# Patient Record
Sex: FEMALE | Race: WHITE | NOT HISPANIC OR LATINO | Employment: OTHER | URBAN - METROPOLITAN AREA
[De-identification: names, ages, dates, MRNs, and addresses within clinical notes are randomized per-mention and may not be internally consistent; named-entity substitution may affect disease eponyms.]

---

## 2017-03-28 ASSESSMENT — ACTIVITIES OF DAILY LIVING (ADL)
DO_MEMBERS_OF_YOUR_HOUSEHOLD_USE_SAFETY_HELMETS?: Y
DO_MEMBERS_OF_YOUR_HOUSEHOLD_WEAR_SEAT_BELTS?: Y
ARE_THERE_SMOKE_DETECTORS_IN_YOUR_HOME?: Y
ARE_THERE_FIREARMS_IN_YOUR_HOME?: N
DO_MEMBERS_OF_YOUR_HOUSEHOLD_USE_SUNSCREEN?: Y
ARE_THERE_CARBON_MONOXIDE_DETECTORS_IN_YOUR_HOME?: Y

## 2017-04-11 ENCOUNTER — OFFICE VISIT (OUTPATIENT)
Dept: INTERNAL MEDICINE | Facility: CLINIC | Age: 67
End: 2017-04-11

## 2017-04-11 VITALS
WEIGHT: 194.6 LBS | HEART RATE: 77 BPM | SYSTOLIC BLOOD PRESSURE: 126 MMHG | HEIGHT: 68 IN | DIASTOLIC BLOOD PRESSURE: 78 MMHG | OXYGEN SATURATION: 97 % | TEMPERATURE: 98.4 F | BODY MASS INDEX: 29.49 KG/M2

## 2017-04-11 DIAGNOSIS — Z12.11 SCREEN FOR COLON CANCER: ICD-10-CM

## 2017-04-11 DIAGNOSIS — Z11.59 NEED FOR HEPATITIS C SCREENING TEST: ICD-10-CM

## 2017-04-11 DIAGNOSIS — Z23 NEED FOR PROPHYLACTIC VACCINATION AGAINST STREPTOCOCCUS PNEUMONIAE (PNEUMOCOCCUS): ICD-10-CM

## 2017-04-11 DIAGNOSIS — J45.990 EXERCISE-INDUCED ASTHMA: ICD-10-CM

## 2017-04-11 DIAGNOSIS — I10 ESSENTIAL HYPERTENSION: ICD-10-CM

## 2017-04-11 DIAGNOSIS — I25.10 CORONARY ARTERY DISEASE INVOLVING NATIVE HEART WITHOUT ANGINA PECTORIS, UNSPECIFIED VESSEL OR LESION TYPE: Primary | ICD-10-CM

## 2017-04-11 DIAGNOSIS — I25.10 CORONARY ARTERY DISEASE INVOLVING NATIVE HEART WITHOUT ANGINA PECTORIS, UNSPECIFIED VESSEL OR LESION TYPE: ICD-10-CM

## 2017-04-11 DIAGNOSIS — E28.39 ESTROGEN DEFICIENCY: ICD-10-CM

## 2017-04-11 LAB
ALBUMIN SERPL-MCNC: 4.1 G/DL (ref 3.4–5)
ALP SERPL-CCNC: 87 U/L (ref 40–150)
ALT SERPL W P-5'-P-CCNC: 42 U/L (ref 0–50)
ANION GAP SERPL CALCULATED.3IONS-SCNC: 6 MMOL/L (ref 3–14)
AST SERPL W P-5'-P-CCNC: 26 U/L (ref 0–45)
BASOPHILS # BLD AUTO: 0 10E9/L (ref 0–0.2)
BASOPHILS NFR BLD AUTO: 0.5 %
BILIRUB SERPL-MCNC: 0.7 MG/DL (ref 0.2–1.3)
BUN SERPL-MCNC: 19 MG/DL (ref 7–30)
CALCIUM SERPL-MCNC: 9 MG/DL (ref 8.5–10.1)
CHLORIDE SERPL-SCNC: 108 MMOL/L (ref 94–109)
CHOLEST SERPL-MCNC: 199 MG/DL
CO2 SERPL-SCNC: 26 MMOL/L (ref 20–32)
CREAT SERPL-MCNC: 0.89 MG/DL (ref 0.52–1.04)
DIFFERENTIAL METHOD BLD: NORMAL
EOSINOPHIL # BLD AUTO: 0.1 10E9/L (ref 0–0.7)
EOSINOPHIL NFR BLD AUTO: 1.6 %
ERYTHROCYTE [DISTWIDTH] IN BLOOD BY AUTOMATED COUNT: 12.6 % (ref 10–15)
GFR SERPL CREATININE-BSD FRML MDRD: 64 ML/MIN/1.7M2
GLUCOSE SERPL-MCNC: 110 MG/DL (ref 70–99)
HCT VFR BLD AUTO: 41.9 % (ref 35–47)
HCV AB SERPL QL IA: NORMAL
HDLC SERPL-MCNC: 86 MG/DL
HGB BLD-MCNC: 14.1 G/DL (ref 11.7–15.7)
IMM GRANULOCYTES # BLD: 0 10E9/L (ref 0–0.4)
IMM GRANULOCYTES NFR BLD: 0.4 %
LDLC SERPL CALC-MCNC: 104 MG/DL
LYMPHOCYTES # BLD AUTO: 1.1 10E9/L (ref 0.8–5.3)
LYMPHOCYTES NFR BLD AUTO: 20.8 %
MCH RBC QN AUTO: 29.9 PG (ref 26.5–33)
MCHC RBC AUTO-ENTMCNC: 33.7 G/DL (ref 31.5–36.5)
MCV RBC AUTO: 89 FL (ref 78–100)
MONOCYTES # BLD AUTO: 0.4 10E9/L (ref 0–1.3)
MONOCYTES NFR BLD AUTO: 8 %
NEUTROPHILS # BLD AUTO: 3.8 10E9/L (ref 1.6–8.3)
NEUTROPHILS NFR BLD AUTO: 68.7 %
NONHDLC SERPL-MCNC: 112 MG/DL
NRBC # BLD AUTO: 0 10*3/UL
NRBC BLD AUTO-RTO: 0 /100
PLATELET # BLD AUTO: 245 10E9/L (ref 150–450)
POTASSIUM SERPL-SCNC: 4.3 MMOL/L (ref 3.4–5.3)
PROT SERPL-MCNC: 7.4 G/DL (ref 6.8–8.8)
RBC # BLD AUTO: 4.71 10E12/L (ref 3.8–5.2)
SODIUM SERPL-SCNC: 140 MMOL/L (ref 133–144)
TRIGL SERPL-MCNC: 42 MG/DL
WBC # BLD AUTO: 5.5 10E9/L (ref 4–11)

## 2017-04-11 RX ORDER — TACROLIMUS 1 MG/G
OINTMENT TOPICAL
COMMUNITY
Start: 2017-03-09 | End: 2020-07-01

## 2017-04-11 RX ORDER — CLOBETASOL PROPIONATE 0.5 MG/ML
SOLUTION TOPICAL
COMMUNITY
Start: 2017-03-09

## 2017-04-11 RX ORDER — BUPROPION HYDROCHLORIDE 150 MG/1
TABLET ORAL
COMMUNITY
Start: 2017-03-11 | End: 2017-04-11

## 2017-04-11 RX ORDER — NITROGLYCERIN 0.4 MG/1
0.4 TABLET SUBLINGUAL EVERY 5 MIN PRN
Qty: 25 TABLET | Refills: 3 | Status: SHIPPED | OUTPATIENT
Start: 2017-04-11 | End: 2019-04-16

## 2017-04-11 RX ORDER — DESONIDE 0.5 MG/G
CREAM TOPICAL
COMMUNITY
Start: 2017-03-10

## 2017-04-11 RX ORDER — ATORVASTATIN CALCIUM 80 MG/1
80 TABLET, FILM COATED ORAL DAILY
Qty: 100 TABLET | Refills: 3 | Status: SHIPPED | OUTPATIENT
Start: 2017-04-11 | End: 2018-04-16

## 2017-04-11 ASSESSMENT — PAIN SCALES - GENERAL: PAINLEVEL: NO PAIN (1)

## 2017-04-11 NOTE — NURSING NOTE
Patient received Pneumonia vaccine.  See immunization list for administration details.  Patient tolerated injection well.     CHRISTA GARCIA at 10:05 AM on 4/11/2017.

## 2017-04-11 NOTE — NURSING NOTE
Chief Complaint   Patient presents with     Physical     Patient here for annual physical.     Tawnya Orellana LPN at 8:38 AM on 4/11/2017.

## 2017-04-11 NOTE — MR AVS SNAPSHOT
After Visit Summary   4/11/2017    Bridget Reeves    MRN: [id number]           Patient Information     Date Of Birth          1950        Visit Information        Provider Department      4/11/2017 9:00 AM Matthew Centeno MD [facility]        Today's Diagnoses     Coronary artery disease involving native heart without angina pectoris, unspecified vessel or lesion type    -  1    Screen for colon cancer        Need for hepatitis C screening test        Need for prophylactic vaccination against Streptococcus pneumoniae (pneumococcus)        Estrogen deficiency        Exercise-induced asthma        Essential hypertension          Care Instructions    Radiology  [phone]         Follow-ups after your visit        Future tests that were ordered for you today     Open Future Orders        Priority Expected Expires Ordered    Dexa hip/pelvis/spine* Routine  4/11/2018 4/11/2017    Hepatitis C Screen Reflex to HCV RNA Quant and Genotype Routine 4/11/2017 4/11/2018 4/11/2017    Fecal colorectal cancer screen FIT - Future (S+30) Routine 5/2/2017 5/11/2017 4/11/2017    Comprehensive metabolic panel Routine  4/11/2018 4/11/2017    Lipid Profile Routine  4/11/2018 4/11/2017    CBC with platelets differential Routine  4/11/2018 4/11/2017            Who to contact     Please call your clinic at [phone] to:    Ask questions about your health    Make or cancel appointments    Discuss your medicines    Learn about your test results    Speak to your doctor   If you have compliments or concerns about an experience at your clinic, or if you wish to file a complaint, please contact [facility] Patient Relations at [phone] or email us at [email or web address]         Additional Information About Your Visit        MyChart Information     [employer] gives you secure access to your electronic health record. If you see a primary care provider, you can  "also send messages to your care team and make appointments. If you have questions, please call your primary care clinic.  If you do not have a primary care provider, please call [phone] and they will assist you.      "[employer] is an electronic gateway that provides easy, online access to your medical records. With "[employer] you can request a clinic appointment, read your test results, renew a prescription or communicate with your care team.     To access your existing account, please contact your [facility] or call [phone] for assistance.        Care EveryWhere ID     This is your Care EveryWhere ID. This could be used by other organizations to access your [facility] medical records  [id number]        Your Vitals Were     Pulse Temperature Height Last Period Pulse Oximetry Breastfeeding?    77 98.4  F (36.9  C) (Oral) 1.725 m (5' 7.91\") 12/01/2013 97% No    BMI (Body Mass Index)                   29.66 kg/m2            Blood Pressure from Last 3 Encounters:   04/11/17 126/78   06/02/16 120/76   05/05/16 113/76    Weight from Last 3 Encounters:   04/11/17 88.3 kg (194 lb 9.6 oz)   06/02/16 88 kg (194 lb)   05/19/16 88.2 kg (194 lb 6.4 oz)              We Performed the Following     Pneumococcal vaccine 23 valent PPSV23  (Pneumovax) [76751]          Where to get your medicines      These [id number] [address] [address]     Phone:  [phone]     atorvastatin 80 MG tablet    nitroglycerin 0.4 MG sublingual tablet          Primary Care Provider Office Phone # Fax #    Matthew Centeno [phone]        PHYSICIANS [address] [place] [address] [address]  [facility] [address]        Thank you!     Thank you for choosing [facility]  for your care. Our goal is always to provide you with excellent care. Hearing back from our patients is one way we " can continue to improve our services. Please take a few minutes to complete the written survey that you may receive in the mail after your visit with us. Thank you!             Your Updated Medication List - Protect others around you: Learn how to safely use, store and throw away your medicines at www.disposemymeds.org.          This list is accurate as of: 4/11/17  9:57 AM.  Always use your most recent med list.                   Brand Name Dispense Instructions for use    albuterol 108 (90 BASE) MCG/ACT Inhaler    PROAIR HFA/PROVENTIL HFA/VENTOLIN HFA    1 Inhaler    Inhale 2 puffs into the lungs 4 times daily As needed for exercise in the cold       aspirin 81 MG EC tablet    aspirin    180 tablet    Take 2 tablets (162 mg) by mouth daily       atorvastatin 80 MG tablet    LIPITOR    100 tablet    Take 1 tablet (80 mg) by mouth daily       azelastine 0.1 % spray    ASTELIN         BUPROPION HCL ER (XL) PO      Take 450 mg by mouth daily       clobetasol 0.05 % external solution    TEMOVATE         CoQ10 100 MG Caps      Take 1 capsule by mouth daily       desonide 0.05 % cream    DESOWEN         IBUPROFEN IB OR      Take  by mouth. PRN       LUNESTA 2 MG tablet   Generic drug:  eszopiclone      Take 1 tablet by mouth nightly as needed.       NAPROXEN PO      Take  by mouth. PRN       nitroglycerin 0.4 MG sublingual tablet    NITROSTAT    25 tablet    Place 1 tablet (0.4 mg) under the tongue every 5 minutes as needed       tacrolimus 0.1 % ointment    PROTOPIC         VAGIFEM 10 MCG Tabs vaginal tablet   Generic drug:  estradiol      Place 10 mcg vaginally Place vaginally 3 times a week.

## 2017-04-11 NOTE — PROGRESS NOTES
HPI  HISTORY OF PRESENT ILLNESS:  A 67 year old with a history of coronary artery disease and a previous myocardial infarction with stent presents today for a physical examination.  She was on a cruise.  She was doing some strength training and exercise and felt a little woozy and lightheaded afterwards.  She thinks that it might have been an angina equivalent related to slow heart rate on the metoprolol and lisinopril.  She had been previously told by Cardiology that these were optional and elected to discontinue them.  She has felt well since that time.  She has not had any further episodes.  She has had no associated chest pain, dyspnea or exercise intolerance.  She is continuing to work out with the help of a .  She is doing a combination of cardio and strength training and says this has been going well.  She has had no other complaints or problems.  She has seen [facility] and having her mammograms and breast exams taken care of through them.  Last DEXA scan was over 10 years ago.  She has had recent cataract surgery bilaterally; this has gone well, and she has excellent vision after this.         Past and Family hx reviewed and updated    Past Medical History:   Diagnosis Date     Coronary artery disease 14    MI , had TPA and angioplasty and stent placement     Eczema      Heart attack (H)      Hyperlipidemia      Hypertension      Pes planus      Psoriasis      Stented coronary artery      Past Surgical History:   Procedure Laterality Date     GYN SURGERY           HEART CATH, ANGIOPLASTY       KERATOTOMY ARCUATE WITH FEMTOSECOND LASER/IMAGING FOR ATIOL Right 2016    Procedure: KERATOTOMY ARCUATE WITH FEMTOSECOND LASER/IMAGING FOR ATIOL;  Surgeon: Dwain Pennington MD;  [facility]     KERATOTOMY ARCUATE WITH FEMTOSECOND LASER/IMAGING FOR ATIOL Left 2016    Procedure: KERATOTOMY ARCUATE WITH FEMTOSECOND LASER/IMAGING FOR ATIOL;  Surgeon: Dwain Pennington MD;  Location:   "[facility]     PHACOEMULSIFICATION CLEAR CORNEA WITH STANDARD INTRAOCULAR LENS IMPLANT Right 5/5/2016    Procedure: PHACOEMULSIFICATION CLEAR CORNEA WITH STANDARD INTRAOCULAR LENS IMPLANT;  Surgeon: Dwain Pennington MD;  Location:  [facility]     PHACOEMULSIFICATION CLEAR CORNEA WITH STANDARD INTRAOCULAR LENS IMPLANT Left 6/2/2016    Procedure: PHACOEMULSIFICATION CLEAR CORNEA WITH STANDARD INTRAOCULAR LENS IMPLANT;  Surgeon: Dwain Pennington MD;  Location:  [facility]     Family History   Problem Relation Age of Onset     Neurologic Disorder Mother      Parkinsonism     C.A.D. Father      CABG     Breast Cancer Paternal Aunt      DIABETES Maternal Grandmother      Breast Cancer Paternal Uncle      Social History     Social History     Marital status:      Spouse name: N/A     Number of children: N/A     Years of education: N/A     Social History Main Topics     Smoking status: Never Smoker     Smokeless tobacco: Never Used     Alcohol use 1.5 oz/week     3 Standard drinks or equivalent per week     Drug use: No     Sexual activity: Yes     Partners: Male     Other Topics Concern     Special Diet Yes     low carb, no wheat     Exercise Yes     swims 6x/wk     Social History Narrative     Answers for HPI/ROS submitted by the patient on 3/28/2017   Annual Exam:  General Symptoms: No  Skin Symptoms: No  HENT Symptoms: No  EYE SYMPTOMS: No  HEART SYMPTOMS: No  LUNG SYMPTOMS: No  INTESTINAL SYMPTOMS: No  URINARY SYMPTOMS: No  GYNECOLOGIC SYMPTOMS: No  BREAST SYMPTOMS: No  SKELETAL SYMPTOMS: No  BLOOD SYMPTOMS: No  NERVOUS SYSTEM SYMPTOMS: No  MENTAL HEALTH SYMPTOMS: No  Frequency of exercise:: 4-5 days/week  Duration of exercise:: 45-60 minutes    Exam:  /78  Pulse 77  Temp 98.4  F (36.9  C) (Oral)  Ht 1.725 m (5' 7.91\")  Wt 88.3 kg (194 lb 9.6 oz)  LMP 12/01/2013  SpO2 97%  Breastfeeding? No  BMI 29.66 kg/m2  194 lbs 9.6 oz  PHYSICAL EXAMINATION:   The patient is alert, oriented with a clear sensorium.   Skin shows no " lesions or rashes and good turgor.   Head is normocephalic and atraumatic.   Eyes show PERRLA. Fundi are benign.   Ears show normal TMs bilaterally.   Mouth shows clear oral mucosa.   Neck shows no nodes, thyromegaly or bruits.   Back is nontender.   Lungs are clear to percussion and auscultation.   Heart shows normal S1 and S2 without murmur or gallop.   Abdomen is soft, nontender without masses or organomegaly.   Extremities show no edema and no evidence of active synovitis.   Neurologic examination shows cranial nerves II-XII intact. Motor shows normal strength. Reflexes are full and symmetrical. Romberg and Tandem gait normal      ASSESSMENT:   1.  Coronary artery disease, asymptomatic.   2.  Orthostasis and hypotension, improved off lisinopril and metoprolol.   3.  History of eczema.   4.  Hypertension, well controlled nonpharmacologically.   5.  Hyperlipidemia, well controlled on present meds.   6.  Impaired glucose tolerance  7.  Osteopenia     PLAN:  She declined a colonoscopy.  She will do FIT testing.  She did agree to a DEXA scan.  Reassess her labs today.  Follow up in a year, sooner or immediately if any increased symptoms or problems before that time.       Matthew Centeno MD  General Internal Medicine  Primary Care Center  [phone]

## 2017-04-12 DIAGNOSIS — Z12.11 SCREEN FOR COLON CANCER: ICD-10-CM

## 2017-04-12 PROCEDURE — 82274 ASSAY TEST FOR BLOOD FECAL: CPT | Performed by: INTERNAL MEDICINE

## 2017-04-13 LAB — HEMOCCULT STL QL IA: NEGATIVE

## 2017-05-30 ENCOUNTER — MYC MEDICAL ADVICE (OUTPATIENT)
Dept: INTERNAL MEDICINE | Facility: CLINIC | Age: 67
End: 2017-05-30

## 2017-06-06 ASSESSMENT — ENCOUNTER SYMPTOMS
STIFFNESS: 0
ORTHOPNEA: 0
FEVER: 0
FATIGUE: 0
HYPERTENSION: 0
LEG PAIN: 0
NECK PAIN: 0
BACK PAIN: 0
MUSCLE CRAMPS: 0
TROUBLE SWALLOWING: 0
SYNCOPE: 0
EXERCISE INTOLERANCE: 0
HOARSE VOICE: 0
WEIGHT GAIN: 0
LEG SWELLING: 0
NAIL CHANGES: 0
CLAUDICATION: 0
PALPITATIONS: 0
SORE THROAT: 0
HALLUCINATIONS: 0
POOR WOUND HEALING: 0
ARTHRALGIAS: 0
SMELL DISTURBANCE: 0
ALTERED TEMPERATURE REGULATION: 1
CHILLS: 0
SLEEP DISTURBANCES DUE TO BREATHING: 0
LIGHT-HEADEDNESS: 1
MYALGIAS: 0
SKIN CHANGES: 0
WEIGHT LOSS: 1
POLYPHAGIA: 0
JOINT SWELLING: 0
SINUS PAIN: 0
HYPOTENSION: 0
TACHYCARDIA: 0
MUSCLE WEAKNESS: 0
POLYDIPSIA: 0
INCREASED ENERGY: 0
DECREASED APPETITE: 0
SINUS CONGESTION: 0
TASTE DISTURBANCE: 0
NECK MASS: 0
NIGHT SWEATS: 1

## 2017-06-19 ENCOUNTER — OFFICE VISIT (OUTPATIENT)
Dept: CARDIOLOGY | Facility: CLINIC | Age: 67
End: 2017-06-19
Attending: INTERNAL MEDICINE
Payer: COMMERCIAL

## 2017-06-19 VITALS
HEART RATE: 84 BPM | DIASTOLIC BLOOD PRESSURE: 84 MMHG | WEIGHT: 189.8 LBS | SYSTOLIC BLOOD PRESSURE: 130 MMHG | BODY MASS INDEX: 28.76 KG/M2 | OXYGEN SATURATION: 99 % | HEIGHT: 68 IN

## 2017-06-19 DIAGNOSIS — I25.118 CORONARY ARTERY DISEASE OF NATIVE ARTERY OF NATIVE HEART WITH STABLE ANGINA PECTORIS (H): Primary | ICD-10-CM

## 2017-06-19 DIAGNOSIS — R07.9 CHEST PAIN, UNSPECIFIED TYPE: ICD-10-CM

## 2017-06-19 DIAGNOSIS — E78.2 MIXED HYPERLIPIDEMIA: ICD-10-CM

## 2017-06-19 PROCEDURE — 99213 OFFICE O/P EST LOW 20 MIN: CPT | Mod: ZF

## 2017-06-19 PROCEDURE — 99214 OFFICE O/P EST MOD 30 MIN: CPT | Mod: GC | Performed by: INTERNAL MEDICINE

## 2017-06-19 ASSESSMENT — PAIN SCALES - GENERAL: PAINLEVEL: NO PAIN (0)

## 2017-06-19 NOTE — MR AVS SNAPSHOT
After Visit Summary   6/19/2017    Bridget Reeves    MRN: [id number]           Patient Information     Date Of Birth          1950        Visit Information        Provider Department      6/19/2017 11:00 AM Jake Herrera MD [facility]        Today's Diagnoses     Myocardial infarction (H)    -  1    Coronary artery disease          Care Instructions    You were seen at the [facility] today.  You saw Dr. Herrera  Here are your Instructions:    1.  Treadmill stress echo.  2.  See Dr. Cerna back in 1 year.      Rosey Fisher RN  Nurse Care Coordinator  Office:  [phone] option #1, then #3 & ask for Rosey (nurse line)  Fax:  [phone]  After Hours:  [phone]  Appointments:  [phone] option #1, then option #1                        Follow-ups after your visit        Follow-up notes from your care team     Return in about 1 year (around 6/19/2018), or Dr. Cerna.      Your next 10 appointments already scheduled     Jun 27, 2017 10:00 AM CDT   Ech Stress Test with ZAID RIVERA STRESS ROOM   [facility] ([facility] [place])    [address] [place] [address] [phone]           1.  Please bring or wear a comfortable two-piece outfit and walking shoes. 2.  Stop eating 3 hours before the test. You may drink water or juice. 3.  Stop all caffeine 12 hours before the test. This includes coffee, tea, soda pop, chocolate and certain medicines (such as Anacin and Excederin). Also avoid decaf coffee and tea, as these contain small amounts of caffeine. 4.  No alcohol, smoking or use of other tobacco products for 12 hours before the test. 5.  Refer to your provider instructions to see if you need to stop any medications (such as beta-blockers or nitrates) for this test. 6.  For patients with diabetes: -   If you take insulin, call your diabetes care team. Ask if you should take a   dose the  morning of your test. -   If you take diabetes medicine by mouth, don't take it on the morning of your test. Bring it with you to take after the test.  (If you have questions, call your diabetes care team) 7.  When you arrive, please tell us if: -   You have diabetes. -   You have taken Viagra, Cialis or Levitra in the past 48 hours. 8.  For any questions that cannot be answered, please contact the ordering physician            Apr 16, 2018  8:20 AM CDT   (Arrive by 8:05 AM)   PHYSICAL with Matthew Centeno MD   [facility] ([facility] [place] [facility])    [address]  4th Floor  [facility] [address] [phone]              Future tests that were ordered for you today     Open Future Orders        Priority Expected Expires Ordered    Exercise Stress Echocardiogram Routine  6/19/2018 6/19/2017            Who to contact     If you have questions or need follow up information about today's clinic visit or your schedule please contact [facility] directly at [phone].  Normal or non-critical lab and imaging results will be communicated to you by shopandsavehart, letter or phone within 4 business days after the clinic has received the results. If you do not hear from us within 7 days, please contact the clinic through [employer] or phone. If you have a critical or abnormal lab result, we will notify you by phone as soon as possible.  Submit refill requests through [employer] or call your pharmacy and they will forward the refill request to us. Please allow 3 business days for your refill to be completed.          Additional Information About Your Visit        [employer] Information     [employer] gives you secure access to your electronic health record. If you see a primary care provider, you can also send messages to your care team and make appointments. If you have questions, please call your primary care clinic.  If you do not have a primary care provider, please call  "[phone] and they will assist you.        Care EveryWhere ID     This is your Care EveryWhere ID. This could be used by other organizations to access your [facility] medical records  [id number]        Your Vitals Were     Pulse Height Last Period Pulse Oximetry BMI (Body Mass Index)       84 1.727 m (5' 8\") 12/01/2013 99% 28.86 kg/m2        Blood Pressure from Last 3 Encounters:   06/19/17 130/84   04/11/17 126/78   06/02/16 120/76    Weight from Last 3 Encounters:   06/19/17 86.1 kg (189 lb 12.8 oz)   04/11/17 88.3 kg (194 lb 9.6 oz)   06/02/16 88 kg (194 lb)               Primary Care Provider Office Phone # Fax #    Matthew Centeno [phone]        [facility] [address] [facility] [address]  [facility] [address]        Thank you!     Thank you for choosing [facility]  for your care. Our goal is always to provide you with excellent care. Hearing back from our patients is one way we can continue to improve our services. Please take a few minutes to complete the written survey that you may receive in the mail after your visit with us. Thank you!             Your Updated Medication List - Protect others around you: Learn how to safely use, store and throw away your medicines at www.disposemymeds.org.          This list is accurate as of: 6/19/17 11:50 AM.  Always use your most recent med list.                   Brand Name Dispense Instructions for use    albuterol 108 (90 BASE) MCG/ACT Inhaler    PROAIR HFA/PROVENTIL HFA/VENTOLIN HFA    1 Inhaler    Inhale 2 puffs into the lungs 4 times daily As needed for exercise in the cold       aspirin 81 MG EC tablet    aspirin    180 tablet    Take 2 tablets (162 mg) by mouth daily       atorvastatin 80 MG tablet    LIPITOR    100 tablet    Take 1 tablet (80 mg) by mouth daily       azelastine 0.1 % spray    ASTELIN         BUPROPION HCL ER (XL) PO      Take 450 mg by mouth daily       clobetasol 0.05 % external solution    TEMOVATE         " CoQ10 100 MG Caps      Take 1 capsule by mouth daily       desonide 0.05 % cream    DESOWEN         IBUPROFEN IB OR      Take  by mouth. PRN       LUNESTA 2 MG tablet   Generic drug:  eszopiclone      Take 1 tablet by mouth nightly as needed.       NAPROXEN PO      Take  by mouth. PRN       nitroglycerin 0.4 MG sublingual tablet    NITROSTAT    25 tablet    Place 1 tablet (0.4 mg) under the tongue every 5 minutes as needed       tacrolimus 0.1 % ointment    PROTOPIC         VAGIFEM 10 MCG Tabs vaginal tablet   Generic drug:  estradiol      Place 10 mcg vaginally Place vaginally 3 times a week.

## 2017-06-19 NOTE — PROGRESS NOTES
"HPI: HISTORY OF PRESENT ILLNESS:   The patient is a 67-year-old woman with a history of myocardial infarction in [facility] on 05/25/2013; at that time, the patient developed severe crushing substernal chest pain while eating.  She was taken to the nearest hospital where an acute anterior ST elevation myocardial infarction was diagnosed and she received lytic therapy.  The patient was subsequently transported to the [facility] where she underwent coronary angiography; this study demonstrated 95 and 75% occlusions of the LAD which underwent stenting.  She was also said to have a 40% mid RCA.  Patient had an echo after the PCI that showed a large area of septal, anterior, anterolateral and apical akinesis with ejection fraction of 30%. She subsequently has done very well without any recurrence of chest pain.  She had been very active throughout her life and currently swims for 20-30 minutes 5 days each week.  She is able to do this without difficulty.  She specifically denied chest discomfort, dyspnea, orthopnea, paroxysmal nocturnal dyspnea, palpitations, presyncope, syncope, edema or claudication.  Patient does have a strong family history on her mother's side of coronary disease; grandfather had his first heart attack at age 52 with subsequent surgery and stent and PCI.  Two of the father's brother also had coronary artery disease at a relatively early age.     6/19/17: Patient reports that since her last visit, she has stopped taking anti-HTN medications due to orthostatic symptoms, which have improved since stopping metoprolol/lisinopril. She reports 3 episodes of angina in the past 12 months which have occurred 30 min after peak exercise. She exercises frequently, both cardiovascular (swimming) and weight training. In the past 10 months, she is on an intensive 3x weekly weight training program. Each episode of angina has been associated with \"orthostatic\" sensation and usually resolves in 20 minutes. " Otherwise, she doesn't have regular, reproducible exertional angina. The quality of the pain is similar to her MI.       PAST MEDICAL HISTORY:  Past Medical History:   Diagnosis Date     Coronary artery disease 14    MI , had TPA and angioplasty and stent placement     Eczema      Heart attack (H)      Hyperlipidemia      Hypertension      IGT (impaired glucose tolerance)      Osteopenia     T score -1.3 in 2017     Pes planus      Psoriasis      Stented coronary artery        CURRENT MEDICATIONS:  Current Outpatient Prescriptions   Medication Sig Dispense Refill     clobetasol (TEMOVATE) 0.05 % external solution        desonide (DESOWEN) 0.05 % cream        tacrolimus (PROTOPIC) 0.1 % ointment        atorvastatin (LIPITOR) 80 MG tablet Take 1 tablet (80 mg) by mouth daily 100 tablet 3     nitroglycerin (NITROSTAT) 0.4 MG sublingual tablet Place 1 tablet (0.4 mg) under the tongue every 5 minutes as needed 25 tablet 3     azelastine (ASTELIN) 0.1 % nasal spray        aspirin (ASPIRIN) 81 MG EC tablet Take 2 tablets (162 mg) by mouth daily 180 tablet 3     BUPROPION HCL ER, XL, PO Take 450 mg by mouth daily       estradiol (VAGIFEM) 10 MCG TABS Place 10 mcg vaginally Place vaginally 3 times a week.       Coenzyme Q10 (COQ10) 100 MG CAPS Take 1 capsule by mouth daily       IBUPROFEN IB OR Take  by mouth. PRN       NAPROXEN PO Take  by mouth. PRN       eszopiclone (LUNESTA) 2 MG tablet Take 1 tablet by mouth nightly as needed.       albuterol (PROAIR HFA, PROVENTIL HFA, VENTOLIN HFA) 108 (90 BASE) MCG/ACT inhaler Inhale 2 puffs into the lungs 4 times daily As needed for exercise in the cold (Patient not taking: Reported on 2017) 1 Inhaler 2       PAST SURGICAL HISTORY:  Past Surgical History:   Procedure Laterality Date     GYN SURGERY           HEART CATH, ANGIOPLASTY       KERATOTOMY ARCUATE WITH FEMTOSECOND LASER/IMAGING FOR ATIOL Right 2016    Procedure: KERATOTOMY ARCUATE WITH  FEMTOSECOND LASER/IMAGING FOR ATIOL;  Surgeon: Dwain Pennington MD;  [facility]  [facility]     KERATOTOMY ARCUATE WITH FEMTOSECOND LASER/IMAGING FOR ATIOL Left 6/2/2016    Procedure: KERATOTOMY ARCUATE WITH FEMTOSECOND LASER/IMAGING FOR ATIOL;  Surgeon: Dwain Pennington MD;  Location:  [facility]     PHACOEMULSIFICATION CLEAR CORNEA WITH STANDARD INTRAOCULAR LENS IMPLANT Right 5/5/2016    Procedure: PHACOEMULSIFICATION CLEAR CORNEA WITH STANDARD INTRAOCULAR LENS IMPLANT;  Surgeon: Dwain Pennington MD;  Location:  [facility]     PHACOEMULSIFICATION CLEAR CORNEA WITH STANDARD INTRAOCULAR LENS IMPLANT Left 6/2/2016    Procedure: PHACOEMULSIFICATION CLEAR CORNEA WITH STANDARD INTRAOCULAR LENS IMPLANT;  Surgeon: Dwain Pennington MD;  Location:  [facility]       ALLERGIES     Allergies   Allergen Reactions     Ambien      Restless leg,  Wild dreams     Amoxicillin Rash     Mold Rash     Bloody nose           FAMILY HISTORY:  Family History   Problem Relation Age of Onset     Neurologic Disorder Mother      Parkinsonism     C.A.D. Father      CABG     Breast Cancer Paternal Aunt      DIABETES Maternal Grandmother      Breast Cancer Paternal Uncle        SOCIAL HISTORY:  History     Social History     Marital Status:      Spouse Name: N/A     Number of Children: N/A     Years of Education: N/A     Social History Main Topics     Smoking status: Never Smoker      Smokeless tobacco: Never Used     Alcohol Use: 1.5 oz/week     3 drink(s) per week     Drug Use: No     Sexually Active: Yes -- Male partner(s)     Other Topics Concern     Special Diet Yes     low carb, no wheat     Exercise Yes     swims 6x/wk     Social History Narrative       ROS:   Constitutional: No fever, chills, or sweats. No weight gain/loss   ENT: No visual disturbance, ear ache, epistaxis  Respiratory: No cough, hemoptysis  Cardiovascular: As per HPI  GI: No nausea, vomiting, hematemesis, or hematochezia  : No urinary frequency, dysuria, or hematuria  Integument:  "Negative  Psychiatric: some depression in the past; treated and not bothersome at present  Neuro: Negative  Endocrinology: Negative   Musculoskeletal: Negative    EXAM:  /84 (BP Location: Right arm, Patient Position: Chair, Cuff Size: Adult Regular)  Pulse 84  Ht 1.727 m (5' 8\")  Wt 86.1 kg (189 lb 12.8 oz)  LMP 12/01/2013  SpO2 99%  BMI 28.86 kg/m2  General: NAD, AAx3  HEENT: Externally normal, sclera clear  CV: regular rhythm, s1, s2, no murmurs or rubs  Lungs: CTAB, non-labored breathing, no wheezing, no crackles  Abd: soft, non-distended, non-tender  Ext: no edema  Skin: no rashes or concerning lesions noted  Neuro: grossly non-focal  Psych: mood/affect appropriate      Labs:  LIPID RESULTS:  Lab Results   Component Value Date    CHOL 199 04/11/2017    HDL 86 04/11/2017     (H) 04/11/2017    TRIG 42 04/11/2017    CHOLHDLRATIO 2.2 04/07/2015       LIVER ENZYME RESULTS:  Lab Results   Component Value Date    AST 26 04/11/2017    ALT 42 04/11/2017       CBC RESULTS:  Lab Results   Component Value Date    WBC 5.5 04/11/2017       BMP RESULTS:  Lab Results   Component Value Date     04/11/2017    POTASSIUM 4.3 04/11/2017    CHLORIDE 108 04/11/2017    CO2 26 04/11/2017    ANIONGAP 6 04/11/2017     (H) 04/11/2017    BUN 19 04/11/2017    CR 0.89 04/11/2017    GFRESTIMATED 64 04/11/2017    GFRESTBLACK 77 04/11/2017    HALLEY 9.0 04/11/2017        A1C RESULTS:  No results found for: A1C    INR RESULTS:  No results found for: INR    Procedures:      Assessment and Plan:     67F hx of coronary artery disease, myocardial infarction, percutaneous coronary intervention to LAD with residual RCA stenosis presenting with atypical anginal chest pain. Suspicion for coronary etiology can not be excluded even though the lack of reproducibility of her symptoms makes this less likely. Nonetheless, obtaining an exercise stress echo will give us valuable prognostic information and help provide a greater " degree of certainty with respect to her management.    1. CAD: Exercise stress echocardiogram  2. Continue current medical regimen, no changes today  3. Follow-up with Dr. Cerna in 12 months, sooner if stress test is abnormal.    Physician Attestation   I, Jake Herrera, saw this patient with the cardiology fellow and agree with the fellow's findings and plan of care as documented in the fellow's note.      I personally reviewed vital signs, medications and labs.    Key findings: 68 yo woman with CAD gives a history of 3 episodes of substernal chest discomfort similar to her prior cardiac pain which may represent angina. We will get an exercise echo to assess her exercise tolerance and blood pressure response to exercise as well as to look for ischemic cause for her symptom.    Jake Herrera  Date of Service (when I saw the patient): 06/19/17        Patient Care Team:  Matthew Centeno MD as PCP - General (Internal Medicine)  SELF, REFERRED    Answers for HPI/ROS submitted by the patient on 6/6/2017   General Symptoms: Yes  Skin Symptoms: Yes  HENT Symptoms: Yes  EYE SYMPTOMS: No  HEART SYMPTOMS: Yes  LUNG SYMPTOMS: No  INTESTINAL SYMPTOMS: No  URINARY SYMPTOMS: No  GYNECOLOGIC SYMPTOMS: No  BREAST SYMPTOMS: No  SKELETAL SYMPTOMS: Yes  BLOOD SYMPTOMS: No  NERVOUS SYSTEM SYMPTOMS: No  MENTAL HEALTH SYMPTOMS: No  Fever: No  Loss of appetite: No  Weight loss: Yes  Weight gain: No  Fatigue: No  Night sweats: Yes  Chills: No  Increased stress: No  Excessive hunger: No  Excessive thirst: No  Feeling hot or cold when others believe the temperature is normal: Yes  Loss of height: No  Post-operative complications: No  Surgical site pain: No  Hallucinations: No  Change in or Loss of Energy: No  Hyperactivity: No  Confusion: No  Changes in hair: No  Changes in moles/birth marks: No  Itching: Yes  Rashes: No  Changes in nails: No  Acne: No  Hair in places you don't want it: No  Change in facial hair:  No  Warts: No  Non-healing sores: No  Scarring: No  Flaking of skin: No  Color changes of hands/feet in cold : No  Sun sensitivity: Yes  Skin thickening: No  Ear pain: No  Ear discharge: No  Hearing loss: Yes  Tinnitus: Yes  Nosebleeds: No  Congestion: No  Sinus pain: No  Trouble swallowing: No   Voice hoarseness: No  Mouth sores: No  Sore throat: No  Tooth pain: No  Gum tenderness: No  Bleeding gums: No  Change in taste: No  Change in sense of smell: No  Dry mouth: No  Hearing aid used: No  Neck lump: No  Chest pain or pressure: Yes  Fast or irregular heartbeat: No  Pain in legs with walking: No  Swelling in feet or ankles: No  Trouble breathing while lying down: No  Fingers or Toes appear blue: No  High blood pressure: No  Low blood pressure: No  Fainting: No  Murmurs: No  Chest pain on exertion: No  Chest pain at rest: Yes  Cramping pain in leg during exercise: No  Pacemaker: No  Varicose veins: No  Edema or swelling: No  Fast heart beat: No  Wake up at night with shortness of breath: No  Heart flutters: No  Light-headedness: Yes  Exercise intolerance: No  Back pain: No  Muscle aches: No  Neck pain: No  Swollen joints: No  Joint pain: No  Bone pain: No  Muscle cramps: No  Muscle weakness: No  Joint stiffness: No  Bone fracture: No

## 2017-06-19 NOTE — PATIENT INSTRUCTIONS
You were seen at the [facility] today.  You saw Dr. Herrera  Here are your Instructions:    1.  Treadmill stress echo.  2.  See Dr. Cerna back in 1 year.      Rosey Fisher RN  Nurse Care Coordinator  Office:  [phone] option #1, then #3 & ask for Rosey (nurse line)  Fax:  [phone]  After Hours:  [phone]  Appointments:  [phone] option #1, then option #1

## 2017-06-27 ENCOUNTER — RADIANT APPOINTMENT (OUTPATIENT)
Dept: CARDIOLOGY | Facility: CLINIC | Age: 67
End: 2017-06-27
Attending: INTERNAL MEDICINE

## 2017-06-27 DIAGNOSIS — I25.118 CORONARY ARTERY DISEASE OF NATIVE ARTERY OF NATIVE HEART WITH STABLE ANGINA PECTORIS (H): ICD-10-CM

## 2017-06-27 RX ADMIN — Medication 5 ML: at 10:28

## 2017-07-20 ENCOUNTER — TRANSFERRED RECORDS (OUTPATIENT)
Dept: HEALTH INFORMATION MANAGEMENT | Facility: CLINIC | Age: 67
End: 2017-07-20

## 2017-08-08 ENCOUNTER — MYC MEDICAL ADVICE (OUTPATIENT)
Dept: INTERNAL MEDICINE | Facility: CLINIC | Age: 67
End: 2017-08-08

## 2018-04-13 ASSESSMENT — ENCOUNTER SYMPTOMS
MUSCLE CRAMPS: 0
INCREASED ENERGY: 0
NAIL CHANGES: 0
STIFFNESS: 0
FEVER: 0
FATIGUE: 0
TASTE DISTURBANCE: 0
JOINT SWELLING: 0
NIGHT SWEATS: 1
DECREASED APPETITE: 0
MYALGIAS: 0
MUSCLE WEAKNESS: 0
ARTHRALGIAS: 0
WEIGHT GAIN: 0
SORE THROAT: 0
BACK PAIN: 0
NECK MASS: 0
SINUS CONGESTION: 0
WEIGHT LOSS: 1
SKIN CHANGES: 0
SINUS PAIN: 0
TROUBLE SWALLOWING: 0
POLYDIPSIA: 0
SMELL DISTURBANCE: 0
NECK PAIN: 0
POLYPHAGIA: 0
ALTERED TEMPERATURE REGULATION: 1
HOARSE VOICE: 0
POOR WOUND HEALING: 0
HALLUCINATIONS: 0
CHILLS: 0

## 2018-04-13 ASSESSMENT — ACTIVITIES OF DAILY LIVING (ADL)
ARE_THERE_CARBON_MONOXIDE_DETECTORS_IN_YOUR_HOME?: N
DO_MEMBERS_OF_YOUR_HOUSEHOLD_USE_SAFETY_HELMETS?: Y
ARE_THERE_FIREARMS_IN_YOUR_HOME?: N
DO_MEMBERS_OF_YOUR_HOUSEHOLD_WEAR_SEAT_BELTS?: Y
ARE_THERE_SMOKE_DETECTORS_IN_YOUR_HOME?: Y
DO_MEMBERS_OF_YOUR_HOUSEHOLD_USE_SUNSCREEN?: Y

## 2018-04-16 ENCOUNTER — OFFICE VISIT (OUTPATIENT)
Dept: INTERNAL MEDICINE | Facility: CLINIC | Age: 68
End: 2018-04-16
Payer: COMMERCIAL

## 2018-04-16 VITALS
OXYGEN SATURATION: 97 % | SYSTOLIC BLOOD PRESSURE: 134 MMHG | WEIGHT: 185.3 LBS | HEART RATE: 79 BPM | BODY MASS INDEX: 28.17 KG/M2 | RESPIRATION RATE: 20 BRPM | DIASTOLIC BLOOD PRESSURE: 74 MMHG

## 2018-04-16 DIAGNOSIS — M17.0 OSTEOARTHRITIS OF BOTH KNEES, UNSPECIFIED OSTEOARTHRITIS TYPE: ICD-10-CM

## 2018-04-16 DIAGNOSIS — I10 ESSENTIAL HYPERTENSION: ICD-10-CM

## 2018-04-16 DIAGNOSIS — Z00.00 ROUTINE HEALTH MAINTENANCE: ICD-10-CM

## 2018-04-16 DIAGNOSIS — B35.4 TINEA CORPORIS: ICD-10-CM

## 2018-04-16 DIAGNOSIS — I25.10 CORONARY ARTERY DISEASE INVOLVING NATIVE HEART WITHOUT ANGINA PECTORIS, UNSPECIFIED VESSEL OR LESION TYPE: ICD-10-CM

## 2018-04-16 DIAGNOSIS — R73.02 IGT (IMPAIRED GLUCOSE TOLERANCE): ICD-10-CM

## 2018-04-16 DIAGNOSIS — J45.990 EXERCISE-INDUCED ASTHMA: ICD-10-CM

## 2018-04-16 DIAGNOSIS — R73.02 IGT (IMPAIRED GLUCOSE TOLERANCE): Primary | ICD-10-CM

## 2018-04-16 LAB
ANION GAP SERPL CALCULATED.3IONS-SCNC: 7 MMOL/L (ref 3–14)
BUN SERPL-MCNC: 21 MG/DL (ref 7–30)
CALCIUM SERPL-MCNC: 9.3 MG/DL (ref 8.5–10.1)
CHLORIDE SERPL-SCNC: 108 MMOL/L (ref 94–109)
CHOLEST SERPL-MCNC: 199 MG/DL
CO2 SERPL-SCNC: 25 MMOL/L (ref 20–32)
CREAT SERPL-MCNC: 0.94 MG/DL (ref 0.52–1.04)
GFR SERPL CREATININE-BSD FRML MDRD: 59 ML/MIN/1.7M2
GLUCOSE SERPL-MCNC: 108 MG/DL (ref 70–99)
HDLC SERPL-MCNC: 89 MG/DL
LDLC SERPL CALC-MCNC: 101 MG/DL
MAGNESIUM SERPL-MCNC: 2.6 MG/DL (ref 1.6–2.3)
NONHDLC SERPL-MCNC: 110 MG/DL
POTASSIUM SERPL-SCNC: 4.1 MMOL/L (ref 3.4–5.3)
SODIUM SERPL-SCNC: 141 MMOL/L (ref 133–144)
TRIGL SERPL-MCNC: 45 MG/DL

## 2018-04-16 RX ORDER — KETOCONAZOLE 20 MG/G
CREAM TOPICAL 2 TIMES DAILY
Qty: 15 G | Refills: 3 | Status: SHIPPED | OUTPATIENT
Start: 2018-04-16 | End: 2022-09-13

## 2018-04-16 RX ORDER — ATORVASTATIN CALCIUM 80 MG/1
80 TABLET, FILM COATED ORAL DAILY
Qty: 100 TABLET | Refills: 3 | Status: SHIPPED | OUTPATIENT
Start: 2018-04-16 | End: 2018-05-22

## 2018-04-16 ASSESSMENT — PAIN SCALES - GENERAL: PAINLEVEL: NO PAIN (0)

## 2018-04-16 NOTE — NURSING NOTE
Patient will call insurance to see if covered for Shingrix.  Emma Tillman LPN 9:08 AM on 4/16/2018

## 2018-04-16 NOTE — PROGRESS NOTES
HPI  68-year-old presents today for physical examination.  She is been doing well now.  She had been feeling intermittently orthostatic and dizzy.  She is concerned that this could represent a recurrence of her heart symptoms as this would occasionally come on 30 minutes after exercise.  She had no exercise related symptoms or problems however.  She works out with a  several days a week she is doing a combination of body building now along with her cardio exercise.  She is not having any muscle soreness related to this or exercise intolerance.  She did read a book on eating more salt has increased her salt consumption is presently supplementing with 1700 mg of salt daily along with calcium and magnesium supplements as well.  With this she reports that the dizziness and other symptoms of all subsided.  She also feels more energetic with this and attributes her improved weight and exercise tolerance to the increased salt consumption.  She continues to follow a ketogenic diet with low carbohydrates.  She is found that this creates less hunger and better weight control.  She has noted a recurrent erythematous rash in the anterior neck.  This tends to come and go intermittently she has not been aware of any definite precipitating or alleviating factors.  It itches periodically.  She has had intermittent eczema in the past but has not had any significant flares otherwise.  She also was having some buckling in her knees [facility] the [facility].  Although she has no pain she has been working with a  and physical therapist to strengthen her quadriceps and improve her strength.  She hopes that this will preserve her joint.    Past and Family hx reviewed and updated    Past Medical History:   Diagnosis Date     Coronary artery disease 5/25/13    MI , had TPA and angioplasty and stent placement     Eczema      Heart attack      Hyperlipidemia      Hypertension       IGT (impaired glucose tolerance)      Osteoarthritis of both knees, unspecified osteoarthritis type 2018     Osteopenia     T score -1.3 in 2017     Pes planus      Psoriasis      Stented coronary artery      Past Surgical History:   Procedure Laterality Date     GYN SURGERY           HEART CATH, ANGIOPLASTY       KERATOTOMY ARCUATE WITH FEMTOSECOND LASER/IMAGING FOR ATIOL Right 2016    Procedure: KERATOTOMY ARCUATE WITH FEMTOSECOND LASER/IMAGING FOR ATIOL;  Surgeon: Dwain Pennington MD;  Location:  [facility]     KERATOTOMY ARCUATE WITH FEMTOSECOND LASER/IMAGING FOR ATIOL Left 2016    Procedure: KERATOTOMY ARCUATE WITH FEMTOSECOND LASER/IMAGING FOR ATIOL;  Surgeon: Dwain Pennington MD;  Location:  [facility]     PHACOEMULSIFICATION CLEAR CORNEA WITH STANDARD INTRAOCULAR LENS IMPLANT Right 2016    Procedure: PHACOEMULSIFICATION CLEAR CORNEA WITH STANDARD INTRAOCULAR LENS IMPLANT;  Surgeon: Dwain Pennington MD;  Location:  [facility]     PHACOEMULSIFICATION CLEAR CORNEA WITH STANDARD INTRAOCULAR LENS IMPLANT Left 2016    Procedure: PHACOEMULSIFICATION CLEAR CORNEA WITH STANDARD INTRAOCULAR LENS IMPLANT;  Surgeon: Dwain Pennington MD;  Location:  [facility]     Family History   Problem Relation Age of Onset     Neurologic Disorder Mother      Parkinsonism     C.A.D. Father      CABG     Breast Cancer Paternal Aunt      DIABETES Maternal Grandmother      Breast Cancer Paternal Uncle      Social History     Social History     Marital status:      Spouse name: N/A     Number of children: N/A     Years of education: N/A     Social History Main Topics     Smoking status: Never Smoker     Smokeless tobacco: Never Used     Alcohol use 1.5 oz/week     3 Standard drinks or equivalent per week     Drug use: No     Sexual activity: Yes     Partners: Male     Other Topics Concern     Special Diet Yes     low carb, no wheat     Exercise Yes     swims 6x/wk     Social History Narrative     Answers for HPI/ROS  submitted by the patient on 4/13/2018   Annual Exam:  Frequency of exercise:: 6-7 days/week  Duration of exercise:: 45-60 minutes  General Symptoms: Yes  Skin Symptoms: Yes  HENT Symptoms: Yes  EYE SYMPTOMS: No  HEART SYMPTOMS: No  LUNG SYMPTOMS: No  INTESTINAL SYMPTOMS: No  URINARY SYMPTOMS: No  GYNECOLOGIC SYMPTOMS: No  BREAST SYMPTOMS: No  SKELETAL SYMPTOMS: Yes  BLOOD SYMPTOMS: No  NERVOUS SYSTEM SYMPTOMS: No  MENTAL HEALTH SYMPTOMS: No  Fever: No  Loss of appetite: No  Weight loss: Yes  Weight gain: No  Fatigue: No  Night sweats: Yes  Chills: No  Increased stress: No  Excessive hunger: No  Excessive thirst: No  Feeling hot or cold when others believe the temperature is normal: Yes  Loss of height: Yes  Post-operative complications: No  Surgical site pain: No  Hallucinations: No  Change in or Loss of Energy: No  Hyperactivity: No  Confusion: No  Changes in hair: No  Changes in moles/birth marks: No  Itching: Yes  Rashes: Yes  Changes in nails: No  Acne: No  Hair in places you don't want it: No  Change in facial hair: No  Warts: No  Non-healing sores: No  Scarring: No  Flaking of skin: No  Color changes of hands/feet in cold : No  Sun sensitivity: No  Skin thickening: No  Ear pain: Yes  Ear discharge: No  Hearing loss: Yes  Tinnitus: Yes  Nosebleeds: No  Congestion: No  Sinus pain: No  Trouble swallowing: No   Voice hoarseness: No  Mouth sores: No  Sore throat: No  Tooth pain: No  Gum tenderness: No  Bleeding gums: No  Change in taste: No  Change in sense of smell: No  Dry mouth: No  Hearing aid used: No  Neck lump: No  Back pain: No  Muscle aches: No  Neck pain: No  Swollen joints: No  Joint pain: No  Bone pain: No  Muscle cramps: No  Muscle weakness: No  Joint stiffness: No  Bone fracture: No    Exam:  /74 (BP Location: Right arm, Patient Position: Sitting, Cuff Size: Adult Regular)  Pulse 79  Resp 20  Wt 84.1 kg (185 lb 4.8 oz)  LMP 12/01/2013  SpO2 97%  BMI 28.17 kg/m2  185 lbs 4.8 oz  PHYSICAL  EXAMINATION:   The patient is alert, oriented with a clear sensorium.   Skin shows slight erythema of anterior neck, no lesions or rashes and good turgor.   Head is normocephalic and atraumatic.   Eyes show PERRLA. Fundi are benign.   Ears show normal TMs bilaterally.   Mouth shows clear oral mucosa.   Neck shows no nodes, thyromegaly or bruits.   Back is nontender.   Lungs are clear to percussion and auscultation.   Heart shows normal S1 and S2 without murmur or gallop.   Abdomen is soft, nontender without masses or organomegaly.   Extremities show no edema and no evidence of active synovitis.   Neurologic examination shows cranial nerves II-XII intact. Motor shows normal strength. Reflexes are full and symmetrical.     ASSESSMENT  1 hyperlipidemia on atorvastatin will recheck and review  2 orthostasis resolved with increase salt intake  3 osteoarthritis of the knees improved with physical therapy  4 impaired glucose tolerance needs reassessment  5 coronary artery disease status post previous MI with angioplasty asymptomatic  6 asthma in remission  7 possible tinea rash anterior neck    Plan  We will refill the atorvastatin reassess her electrolytes and lipids do fecal occult blood testing in lieu of a colonoscopy and will try her on ketoconazole for the next rash.    This note was completed using Dragon voice recognition software.  Although reviewed after completion, some word and grammatical errors may occur.    Matthew Centeno MD  General Internal Medicine  [facility]  [phone]

## 2018-04-16 NOTE — MR AVS SNAPSHOT
After Visit Summary   4/16/2018    Bridget Reeves    MRN: [id number]           Patient Information     Date Of Birth          1950        Visit Information        Provider Department      4/16/2018 8:20 AM Matthew Centeno MD [facility]        Today's Diagnoses     IGT (impaired glucose tolerance)    -  1    Exercise-induced asthma        Essential hypertension        Coronary artery disease involving native heart without angina pectoris, unspecified vessel or lesion type        Routine health maintenance        Osteoarthritis of both knees, unspecified osteoarthritis type        Tinea corporis          Care Instructions    Primary Care Center: [phone]     Primary Care Center Medication Refill Request Information:  * Please contact your pharmacy regarding ANY request for medication refills.  ** [facility] Prescription Fax = [phone]  * Please allow 3 business days for routine medication refills.  * Please allow 5 business days for controlled substance medication refills.     Primary Care Center Test Result notification information:  *You will be notified with in 7-10 days of your appointment day regarding the results of your test.  If you are on MyChart you will be notified as soon as the provider has reviewed the results and signed off on them.          Follow-ups after your visit        Your next 10 appointments already scheduled     Apr 16, 2018  9:45 AM CDT   LAB with  LAB   [facility] ([facility])    [address] [place] [id number] [phone]           Please do not eat 10-12 hours before your appointment if you are coming in fasting for labs on lipids, cholesterol, or glucose (sugar). This does not apply to pregnant women. Water, hot tea and black coffee (with nothing added) are okay. Do not drink other fluids, diet soda or chew gum.              Future tests that were ordered for you today     Open  Future Orders        Priority Expected Expires Ordered    Fecal colorectal cancer screen FIT - Future (S+30) Routine 5/7/2018 5/16/2018 4/16/2018    Basic metabolic panel Routine  4/16/2019 4/16/2018    Lipid Profile Routine  4/16/2019 4/16/2018    Magnesium Routine  4/16/2019 4/16/2018            Who to contact     Please call your clinic at [phone] to:    Ask questions about your health    Make or cancel appointments    Discuss your medicines    Learn about your test results    Speak to your doctor            Additional Information About Your Visit        [employer] Information     [employer] gives you secure access to your electronic health record. If you see a primary care provider, you can also send messages to your care team and make appointments. If you have questions, please call your primary care clinic.  If you do not have a primary care provider, please call [phone] and they will assist you.      [employer] is an electronic gateway that provides easy, online access to your medical records. With [employer], you can request a clinic appointment, read your test results, renew a prescription or communicate with your care team.     To access your existing account, please contact your [facility] or call [phone] for assistance.        Care EveryWhere ID     This is your Care EveryWhere ID. This could be used by other organizations to access your [facility] medical records  [id number]        Your Vitals Were     Pulse Respirations Last Period Pulse Oximetry BMI (Body Mass Index)       79 20 12/01/2013 97% 28.17 kg/m2        Blood Pressure from Last 3 Encounters:   04/16/18 134/74   06/19/17 130/84   04/11/17 126/78    Weight from Last 3 Encounters:   04/16/18 84.1 kg (185 lb 4.8 oz)   06/19/17 86.1 kg (189 lb 12.8 oz)   04/11/17 88.3 kg (194 lb 9.6 oz)              We Performed the Following     ZOSTER VACCINE RECOMBINANT ADJUVANTED IM NJX (SHINGRIX)          Today's Medication  Changes          These changes are accurate as of 4/16/18  8:58 AM.  If you have any questions, ask your nurse or doctor.               Start taking these medicines.        Dose/Directions    ketoconazole 2 % cream   Commonly known as:  NIZORAL   Used for:  Tinea corporis   Started by:  Matthew Centeno MD        Apply topically 2 times daily   Quantity:  15 g   Refills:  3            Where to get your medicines      These medications were sent to [facility] #[id number] - [address] [address]     Phone:  [phone]     atorvastatin 80 MG tablet    ketoconazole 2 % cream                Primary Care Provider Office Phone # Fax #    Matthew Centeno [phone]       [address] [address] [address]  [facility] [address]        Equal Access to Services     [facility] Hospital[facility]RISTY : Hadii vivian castillo hadasho Soomaali, waaxda luqadaha, qaybta kaalmada adeegyada, roni taylor . So [facility] [phone].    ATENCIÓN: Si habla español, tiene a braden disposición servicios gratuitos de asistencia lingüística. [facility] [phone].    We comply with applicable federal civil rights laws and [place] laws. We do not discriminate on the basis of race, color, national origin, age, disability, sex, sexual orientation, or gender identity.            Thank you!     Thank you for choosing [facility]  for your care. Our goal is always to provide you with excellent care. Hearing back from our patients is one way we can continue to improve our services. Please take a few minutes to complete the written survey that you may receive in the mail after your visit with us. Thank you!             Your Updated Medication List - Protect others around you: Learn how to safely use, store and throw away your medicines at www.disposemymeds.org.          This list is accurate as of 4/16/18  8:58 AM.  Always use your most recent  med list.                   Brand Name Dispense Instructions for use Diagnosis    albuterol 108 (90 Base) MCG/ACT Inhaler    PROAIR HFA/PROVENTIL HFA/VENTOLIN HFA    1 Inhaler    Inhale 2 puffs into the lungs 4 times daily As needed for exercise in the cold    Exercise-induced asthma, Hypertension, Hyperlipidemia LDL goal < 130, CAD (coronary artery disease)       aspirin 81 MG EC tablet    aspirin    180 tablet    Take 2 tablets (162 mg) by mouth daily    CAD (coronary artery disease), Exercise-induced asthma, Hypertension, Hyperlipidemia LDL goal < 130       atorvastatin 80 MG tablet    LIPITOR    100 tablet    Take 1 tablet (80 mg) by mouth daily    Exercise-induced asthma, Essential hypertension, Coronary artery disease involving native heart without angina pectoris, unspecified vessel or lesion type       azelastine 0.1 % spray    ASTELIN          BUPROPION HCL ER (XL) PO      Take 450 mg by mouth daily        clobetasol 0.05 % external solution    TEMOVATE          CoQ10 100 MG Caps      Take 200 mg by mouth daily        desonide 0.05 % cream    DESOWEN          IBUPROFEN IB OR      Take  by mouth. PRN        ketoconazole 2 % cream    NIZORAL    15 g    Apply topically 2 times daily    Tinea corporis       LUNESTA 2 MG tablet   Generic drug:  eszopiclone      Take 1 tablet by mouth nightly as needed.        NAPROXEN PO      Take  by mouth. PRN        nitroGLYcerin 0.4 MG sublingual tablet    NITROSTAT    25 tablet    Place 1 tablet (0.4 mg) under the tongue every 5 minutes as needed    Coronary artery disease involving native heart without angina pectoris, unspecified vessel or lesion type       tacrolimus 0.1 % ointment    PROTOPIC          VAGIFEM 10 MCG Tabs vaginal tablet   Generic drug:  estradiol      Place 10 mcg vaginally Place vaginally 3 times a week.

## 2018-04-16 NOTE — PATIENT INSTRUCTIONS
[facility]: [phone]     [facility] Medication Refill Request Information:  * Please contact your pharmacy regarding ANY request for medication refills.  ** [facility] Prescription Fax = [phone]  * Please allow 3 business days for routine medication refills.  * Please allow 5 business days for controlled substance medication refills.     [facility] Test Result notification information:  *You will be notified with in 7-10 days of your appointment day regarding the results of your test.  If you are on MyChart you will be notified as soon as the provider has reviewed the results and signed off on them.

## 2018-04-16 NOTE — NURSING NOTE
Chief Complaint   Patient presents with     Physical     established physical   Emma Tillman LPN 7:52 AM on 4/16/2018      Rooming Note  Health Maintenance   Health Maintenance Due   Topic Date Due     FALL RISK ASSESSMENT  04/11/2018     FIT Q1 YR  04/12/2018    All health maintenance items discussed and pended.  Emma Tillman LPN 7:54 AM on 4/16/2018

## 2018-04-17 DIAGNOSIS — I25.10 CORONARY ARTERY DISEASE INVOLVING NATIVE HEART WITHOUT ANGINA PECTORIS, UNSPECIFIED VESSEL OR LESION TYPE: ICD-10-CM

## 2018-04-17 DIAGNOSIS — R73.02 IGT (IMPAIRED GLUCOSE TOLERANCE): ICD-10-CM

## 2018-04-17 DIAGNOSIS — I10 ESSENTIAL HYPERTENSION: ICD-10-CM

## 2018-04-17 DIAGNOSIS — J45.990 EXERCISE-INDUCED ASTHMA: ICD-10-CM

## 2018-04-17 PROCEDURE — 82274 ASSAY TEST FOR BLOOD FECAL: CPT | Performed by: INTERNAL MEDICINE

## 2018-04-19 LAB — HEMOCCULT STL QL IA: NEGATIVE

## 2019-02-27 ENCOUNTER — DOCUMENTATION ONLY (OUTPATIENT)
Dept: CARE COORDINATION | Facility: CLINIC | Age: 69
End: 2019-02-27

## 2019-02-27 DIAGNOSIS — E78.5 HYPERLIPIDEMIA, UNSPECIFIED HYPERLIPIDEMIA TYPE: ICD-10-CM

## 2019-02-27 RX ORDER — ROSUVASTATIN CALCIUM 10 MG/1
10 TABLET, COATED ORAL DAILY
Qty: 90 TABLET | Refills: 3 | OUTPATIENT
Start: 2019-02-27

## 2019-02-28 RX ORDER — ROSUVASTATIN CALCIUM 10 MG/1
10 TABLET, COATED ORAL DAILY
Qty: 90 TABLET | Refills: 0 | Status: SHIPPED | OUTPATIENT
Start: 2019-02-28 | End: 2019-04-16

## 2019-04-02 ASSESSMENT — ENCOUNTER SYMPTOMS
ALTERED TEMPERATURE REGULATION: 1
POOR WOUND HEALING: 0
FATIGUE: 0
WEIGHT GAIN: 0
NAIL CHANGES: 0
NIGHT SWEATS: 1
TROUBLE SWALLOWING: 1
FEVER: 0
DOUBLE VISION: 0
EYE IRRITATION: 0
HOARSE VOICE: 0
SKIN CHANGES: 0
WEIGHT LOSS: 1
HALLUCINATIONS: 0
POLYDIPSIA: 0
POLYPHAGIA: 0
INCREASED ENERGY: 0
DECREASED APPETITE: 0
EYE WATERING: 0
EYE PAIN: 0
SORE THROAT: 0
SINUS CONGESTION: 0
SMELL DISTURBANCE: 0
EYE REDNESS: 0
SINUS PAIN: 0
TASTE DISTURBANCE: 0
NECK MASS: 0
CHILLS: 0

## 2019-04-02 ASSESSMENT — ACTIVITIES OF DAILY LIVING (ADL)
IN_THE_PAST_7_DAYS,_DID_YOU_NEED_HELP_FROM_OTHERS_TO_TAKE_CARE_OF_THINGS_SUCH_AS_LAUNDRY_AND_HOUSEKEEPING,_BANKING,_SHOPPING,_USING_THE_TELEPHONE,_FOOD_PREPARATION,_TRANSPORTATION,_OR_TAKING_YOUR_OWN_MEDICATIONS?: N
IN_THE_PAST_7_DAYS,_DID_YOU_NEED_HELP_FROM_OTHERS_TO_PERFORM_EVERYDAY_ACTIVITIES_SUCH_AS_EATING,_GETTING_DRESSED,_GROOMING,_BATHING,_WALKING,_OR_USING_THE_TOILET: N

## 2019-04-16 ENCOUNTER — OFFICE VISIT (OUTPATIENT)
Dept: INTERNAL MEDICINE | Facility: CLINIC | Age: 69
End: 2019-04-16
Payer: COMMERCIAL

## 2019-04-16 ENCOUNTER — HOSPITAL ENCOUNTER (OUTPATIENT)
Facility: CLINIC | Age: 69
Setting detail: SPECIMEN
Discharge: HOME | End: 2019-04-16
Admitting: INTERNAL MEDICINE
Payer: COMMERCIAL

## 2019-04-16 VITALS
DIASTOLIC BLOOD PRESSURE: 80 MMHG | OXYGEN SATURATION: 99 % | BODY MASS INDEX: 27.74 KG/M2 | HEIGHT: 68 IN | RESPIRATION RATE: 20 BRPM | WEIGHT: 183 LBS | HEART RATE: 81 BPM | SYSTOLIC BLOOD PRESSURE: 116 MMHG

## 2019-04-16 DIAGNOSIS — I21.9 MYOCARDIAL INFARCTION, UNSPECIFIED MI TYPE, UNSPECIFIED ARTERY (H): Primary | ICD-10-CM

## 2019-04-16 DIAGNOSIS — E78.5 HYPERLIPIDEMIA LDL GOAL <100: ICD-10-CM

## 2019-04-16 DIAGNOSIS — I25.10 CORONARY ARTERY DISEASE INVOLVING NATIVE HEART WITHOUT ANGINA PECTORIS, UNSPECIFIED VESSEL OR LESION TYPE: ICD-10-CM

## 2019-04-16 DIAGNOSIS — E78.5 HYPERLIPIDEMIA, UNSPECIFIED HYPERLIPIDEMIA TYPE: ICD-10-CM

## 2019-04-16 DIAGNOSIS — I21.9 MYOCARDIAL INFARCTION, UNSPECIFIED MI TYPE, UNSPECIFIED ARTERY (H): ICD-10-CM

## 2019-04-16 DIAGNOSIS — Z12.11 SPECIAL SCREENING FOR MALIGNANT NEOPLASMS, COLON: ICD-10-CM

## 2019-04-16 LAB
ALBUMIN SERPL-MCNC: 4 G/DL (ref 3.4–5)
ALP SERPL-CCNC: 65 U/L (ref 40–150)
ALT SERPL W P-5'-P-CCNC: 38 U/L (ref 0–50)
ANION GAP SERPL CALCULATED.3IONS-SCNC: 3 MMOL/L (ref 3–14)
AST SERPL W P-5'-P-CCNC: 20 U/L (ref 0–45)
BASOPHILS # BLD AUTO: 0 10E9/L (ref 0–0.2)
BASOPHILS NFR BLD AUTO: 0.4 %
BILIRUB SERPL-MCNC: 0.5 MG/DL (ref 0.2–1.3)
BUN SERPL-MCNC: 22 MG/DL (ref 7–30)
CALCIUM SERPL-MCNC: 9.6 MG/DL (ref 8.5–10.1)
CHLORIDE SERPL-SCNC: 108 MMOL/L (ref 94–109)
CHOLEST SERPL-MCNC: 201 MG/DL
CO2 SERPL-SCNC: 27 MMOL/L (ref 20–32)
CREAT SERPL-MCNC: 1.05 MG/DL (ref 0.52–1.04)
DIFFERENTIAL METHOD BLD: NORMAL
EOSINOPHIL # BLD AUTO: 0.1 10E9/L (ref 0–0.7)
EOSINOPHIL NFR BLD AUTO: 1.5 %
ERYTHROCYTE [DISTWIDTH] IN BLOOD BY AUTOMATED COUNT: 12.6 % (ref 10–15)
GFR SERPL CREATININE-BSD FRML MDRD: 55 ML/MIN/{1.73_M2}
GLUCOSE SERPL-MCNC: 93 MG/DL (ref 70–99)
HCT VFR BLD AUTO: 44.8 % (ref 35–47)
HDLC SERPL-MCNC: 91 MG/DL
HGB BLD-MCNC: 14.1 G/DL (ref 11.7–15.7)
IMM GRANULOCYTES # BLD: 0 10E9/L (ref 0–0.4)
IMM GRANULOCYTES NFR BLD: 0.4 %
LDLC SERPL CALC-MCNC: 98 MG/DL
LYMPHOCYTES # BLD AUTO: 1.1 10E9/L (ref 0.8–5.3)
LYMPHOCYTES NFR BLD AUTO: 21.7 %
MCH RBC QN AUTO: 28.7 PG (ref 26.5–33)
MCHC RBC AUTO-ENTMCNC: 31.5 G/DL (ref 31.5–36.5)
MCV RBC AUTO: 91 FL (ref 78–100)
MONOCYTES # BLD AUTO: 0.5 10E9/L (ref 0–1.3)
MONOCYTES NFR BLD AUTO: 8.7 %
NEUTROPHILS # BLD AUTO: 3.5 10E9/L (ref 1.6–8.3)
NEUTROPHILS NFR BLD AUTO: 67.3 %
NONHDLC SERPL-MCNC: 110 MG/DL
NRBC # BLD AUTO: 0 10*3/UL
NRBC BLD AUTO-RTO: 0 /100
PLATELET # BLD AUTO: 250 10E9/L (ref 150–450)
POTASSIUM SERPL-SCNC: 4.6 MMOL/L (ref 3.4–5.3)
PROT SERPL-MCNC: 7.3 G/DL (ref 6.8–8.8)
RBC # BLD AUTO: 4.91 10E12/L (ref 3.8–5.2)
SODIUM SERPL-SCNC: 138 MMOL/L (ref 133–144)
TRIGL SERPL-MCNC: 59 MG/DL
WBC # BLD AUTO: 5.3 10E9/L (ref 4–11)

## 2019-04-16 PROCEDURE — 82274 ASSAY TEST FOR BLOOD FECAL: CPT | Performed by: INTERNAL MEDICINE

## 2019-04-16 RX ORDER — ROSUVASTATIN CALCIUM 20 MG/1
20 TABLET, COATED ORAL DAILY
Qty: 100 TABLET | Refills: 3 | Status: SHIPPED | OUTPATIENT
Start: 2019-04-16 | End: 2020-04-16

## 2019-04-16 RX ORDER — ROSUVASTATIN CALCIUM 10 MG/1
10 TABLET, COATED ORAL DAILY
Qty: 90 TABLET | Refills: 3 | Status: SHIPPED | OUTPATIENT
Start: 2019-04-16 | End: 2019-04-16

## 2019-04-16 RX ORDER — NITROGLYCERIN 0.4 MG/1
0.4 TABLET SUBLINGUAL EVERY 5 MIN PRN
Qty: 25 TABLET | Refills: 3 | Status: SHIPPED | OUTPATIENT
Start: 2019-04-16 | End: 2020-04-16

## 2019-04-16 ASSESSMENT — PAIN SCALES - GENERAL: PAINLEVEL: MILD PAIN (2)

## 2019-04-16 ASSESSMENT — MIFFLIN-ST. JEOR: SCORE: 1408.58

## 2019-04-16 NOTE — PROGRESS NOTES
HPI  69-year-old presents today for physical examination.  She has been doing well over the past year.  She is doing strength training in body building 3 days a week for an hour and then another 3 days a week on the elliptical for 30 minutes.  Is tolerating this well without chest pain or dyspnea.  She is been feeling well no symptoms or problems no chest pain dizziness lightheadedness or other symptoms.  She has had some occasional visual change related to redundant lids and some sensation of interference with the field of vision with this.  Otherwise tolerating her medications well depression is in remission she is following with psychiatry regarding this.  Past Medical History:   Diagnosis Date     Coronary artery disease 14    MI , had TPA and angioplasty and stent placement     Eczema      Heart attack      Hyperlipidemia      Hypertension      IGT (impaired glucose tolerance)      Osteoarthritis of both knees, unspecified osteoarthritis type 2018     Osteopenia     T score -1.3 in 2017     Pes planus      Psoriasis      Stented coronary artery      Past Surgical History:   Procedure Laterality Date     GYN SURGERY           HEART CATH, ANGIOPLASTY       KERATOTOMY ARCUATE WITH FEMTOSECOND LASER/IMAGING FOR ATIOL Right 2016    Procedure: KERATOTOMY ARCUATE WITH FEMTOSECOND LASER/IMAGING FOR ATIOL;  Surgeon: Dwain Pennington MD;  [facility]:  [facility]     KERATOTOMY ARCUATE WITH FEMTOSECOND LASER/IMAGING FOR ATIOL Left 2016    Procedure: KERATOTOMY ARCUATE WITH FEMTOSECOND LASER/IMAGING FOR ATIOL;  Surgeon: Dwain Pennington MD;  Location:  [facility]     PHACOEMULSIFICATION CLEAR CORNEA WITH STANDARD INTRAOCULAR LENS IMPLANT Right 2016    Procedure: PHACOEMULSIFICATION CLEAR CORNEA WITH STANDARD INTRAOCULAR LENS IMPLANT;  Surgeon: Dwain Pennington MD;  Location:  [facility]     PHACOEMULSIFICATION CLEAR CORNEA WITH STANDARD INTRAOCULAR LENS IMPLANT Left 2016    Procedure:  PHACOEMULSIFICATION CLEAR CORNEA WITH STANDARD INTRAOCULAR LENS IMPLANT;  Surgeon: wDain Pennington MD;  Location: [facility]     Family History   Problem Relation Age of Onset     Neurologic Disorder Mother         Parkinsonism     C.A.D. Father         CABG     Breast Cancer Paternal Aunt      Diabetes Maternal Grandmother      Breast Cancer Paternal Uncle      Social History     Socioeconomic History     Marital status:      Spouse name: Not on file     Number of children: Not on file     Years of education: Not on file     Highest education level: Not on file   Occupational History     Not on file   Social Needs     Financial resource strain: Not on file     Food insecurity:     Worry: Not on file     Inability: Not on file     Transportation needs:     Medical: Not on file     Non-medical: Not on file   Tobacco Use     Smoking status: Never Smoker     Smokeless tobacco: Never Used   Substance and Sexual Activity     Alcohol use: Yes     Alcohol/week: 1.5 oz     Types: 3 Standard drinks or equivalent per week     Drug use: No     Sexual activity: Yes     Partners: Male   Lifestyle     Physical activity:     Days per week: Not on file     Minutes per session: Not on file     Stress: Not on file   Relationships     Social connections:     Talks on phone: Not on file     Gets together: Not on file     Attends Anabaptist service: Not on file     Active member of club or organization: Not on file     Attends meetings of clubs or organizations: Not on file     Relationship status: Not on file     Intimate partner violence:     Fear of current or ex partner: Not on file     Emotionally abused: Not on file     Physically abused: Not on file     Forced sexual activity: Not on file   Other Topics Concern     Parent/sibling w/ CABG, MI or angioplasty before 65F 55M? Not Asked      Service Not Asked     Blood Transfusions Not Asked     Caffeine Concern Not Asked     Occupational Exposure Not Asked     Hobby Hazards  Not Asked     Sleep Concern Not Asked     Stress Concern Not Asked     Weight Concern Not Asked     Special Diet Yes     Comment: low carb, no wheat     Back Care Not Asked     Exercise Yes     Comment: swims 6x/wk     Bike Helmet Not Asked     Seat Belt Not Asked     Self-Exams Not Asked   Social History Narrative     Not on file     Answers for HPI/ROS submitted by the patient on 4/2/2019   General Symptoms: Yes  Skin Symptoms: Yes  HENT Symptoms: Yes  EYE SYMPTOMS: Yes  HEART SYMPTOMS: No  LUNG SYMPTOMS: No  INTESTINAL SYMPTOMS: No  URINARY SYMPTOMS: No  GYNECOLOGIC SYMPTOMS: No  BREAST SYMPTOMS: No  SKELETAL SYMPTOMS: No  BLOOD SYMPTOMS: No  NERVOUS SYSTEM SYMPTOMS: No  MENTAL HEALTH SYMPTOMS: No  Fever: No  Loss of appetite: No  Weight loss: Yes  Weight gain: No  Fatigue: No  Night sweats: Yes  Chills: No  Increased stress: No  Excessive hunger: No  Excessive thirst: No  Feeling hot or cold when others believe the temperature is normal: Yes  Loss of height: No  Post-operative complications: No  Surgical site pain: No  Hallucinations: No  Change in or Loss of Energy: No  Hyperactivity: No  Confusion: No  Changes in hair: No  Changes in moles/birth marks: No  Itching: No  Rashes: Yes  Changes in nails: No  Acne: No  Hair in places you don't want it: No  Change in facial hair: No  Warts: No  Non-healing sores: No  Scarring: No  Flaking of skin: Yes  Color changes of hands/feet in cold : No  Sun sensitivity: No  Skin thickening: No  Ear pain: Yes  Ear discharge: No  Hearing loss: Yes  Tinnitus: Yes  Nosebleeds: No  Congestion: No  Sinus pain: No  Trouble swallowing: Yes   Voice hoarseness: No  Mouth sores: No  Sore throat: No  Tooth pain: No  Gum tenderness: No  Bleeding gums: No  Change in taste: No  Change in sense of smell: No  Dry mouth: No  Hearing aid used: No  Neck lump: No  Eye pain: No  Vision loss: No  Dry eyes: Yes  Watery eyes: No  Eye bulging: No  Double vision: No  Flashing of lights: No  Spots:  "No  Floaters: No  Redness: No  Crossed eyes: No  Tunnel Vision: No  Yellowing of eyes: No  Eye irritation: No    Exam:  /80 (BP Location: Right arm, Patient Position: Sitting, Cuff Size: Adult Regular)   Pulse 81   Resp 20   Ht 1.727 m (5' 8\")   Wt 83 kg (183 lb)   LMP 12/01/2013   SpO2 99%   BMI 27.83 kg/m    183 lbs 0 oz  PHYSICAL EXAMINATION:   The patient is alert, oriented with a clear sensorium.   Skin shows no lesions or rashes and good turgor.   Head is normocephalic and atraumatic.   Eyes show redundant upper lids, PERRLA. Fundi are benign.   Ears show normal TMs bilaterally.   Mouth shows clear oral mucosa.   Neck shows no nodes, thyromegaly or bruits.   Back is nontender.   Lungs are clear to percussion and auscultation.   Heart shows normal S1 and S2 without murmur or gallop.   Abdomen is soft, nontender without masses or organomegaly.   Extremities show no edema and no evidence of active synovitis.   Neurologic examination shows cranial nerves II-XII intact. Motor shows normal strength. Reflexes are full and symmetrical.     ASSESSMENT  1 coronary artery disease with previous MI asymptomatic  2 hyperlipidemia needs reassessment  3 hypertension well controlled  4 depression in remission    Plan  We will continue her on the Crestor refilled her nitroglycerin although she is not using it ever continue with the aspirin reassess her labs today she is current on her immunizations will screen for colon cancer with annual fit testing    This note was completed using Dragon voice recognition software.  Although reviewed after completion, some word and grammatical errors may occur.    Mtathew Centeno MD  General Internal Medicine  [facility]  [phone]        "

## 2019-04-26 DIAGNOSIS — Z12.11 SPECIAL SCREENING FOR MALIGNANT NEOPLASMS, COLON: ICD-10-CM

## 2019-04-26 LAB — HEMOCCULT STL QL IA: NEGATIVE

## 2019-11-04 ENCOUNTER — TELEPHONE (OUTPATIENT)
Dept: INTERNAL MEDICINE | Facility: CLINIC | Age: 69
End: 2019-11-04

## 2019-11-04 NOTE — TELEPHONE ENCOUNTER
Patient has standing orders. Can get labs done before appointment. Tawnya Orellana LPN 11/4/2019 10:01 AM

## 2019-11-04 NOTE — TELEPHONE ENCOUNTER
Patient's  states they have a physical in April and are wondering if they need to have any labs done before appointment. Tawnya Orellana LPN 11/4/2019 10:00 AM

## 2019-11-04 NOTE — TELEPHONE ENCOUNTER
Called and spoke with patient to informed that there are standing orders for patient's lab. Patient can have the lab done prior to 4/22.

## 2019-12-02 ENCOUNTER — TELEPHONE (OUTPATIENT)
Dept: INTERNAL MEDICINE | Facility: CLINIC | Age: 69
End: 2019-12-02

## 2020-03-02 ENCOUNTER — HEALTH MAINTENANCE LETTER (OUTPATIENT)
Age: 70
End: 2020-03-02

## 2020-04-16 ENCOUNTER — TELEPHONE (OUTPATIENT)
Dept: INTERNAL MEDICINE | Facility: CLINIC | Age: 70
End: 2020-04-16

## 2020-04-16 DIAGNOSIS — I25.10 CORONARY ARTERY DISEASE INVOLVING NATIVE HEART WITHOUT ANGINA PECTORIS, UNSPECIFIED VESSEL OR LESION TYPE: ICD-10-CM

## 2020-04-16 DIAGNOSIS — E78.5 HYPERLIPIDEMIA, UNSPECIFIED HYPERLIPIDEMIA TYPE: ICD-10-CM

## 2020-04-16 RX ORDER — ROSUVASTATIN CALCIUM 20 MG/1
20 TABLET, COATED ORAL DAILY
Qty: 100 TABLET | Refills: 0 | Status: SHIPPED | OUTPATIENT
Start: 2020-04-16 | End: 2020-07-01

## 2020-04-16 RX ORDER — NITROGLYCERIN 0.4 MG/1
0.4 TABLET SUBLINGUAL EVERY 5 MIN PRN
Qty: 25 TABLET | Refills: 0 | Status: SHIPPED | OUTPATIENT
Start: 2020-04-16 | End: 2023-09-13

## 2020-04-16 NOTE — TELEPHONE ENCOUNTER
Patient had an annual physical scheduled for April, though this has been rescheduled to July due to COVID-19.    Per clinic coordinator, patient will require refills to last until new appointment date. Rosuvastatin and nitoglycerin refilled if needed. Lab orders extended so patient can have these drawn prior to July appointment.    Jayne Castro RN ([facility])

## 2020-06-30 DIAGNOSIS — E78.5 HYPERLIPIDEMIA LDL GOAL <100: ICD-10-CM

## 2020-06-30 DIAGNOSIS — I21.9 MYOCARDIAL INFARCTION, UNSPECIFIED MI TYPE, UNSPECIFIED ARTERY (H): ICD-10-CM

## 2020-06-30 LAB
ALBUMIN SERPL-MCNC: 4 G/DL (ref 3.4–5)
ALP SERPL-CCNC: 74 U/L (ref 40–150)
ALT SERPL W P-5'-P-CCNC: 36 U/L (ref 0–50)
ANION GAP SERPL CALCULATED.3IONS-SCNC: 5 MMOL/L (ref 3–14)
AST SERPL W P-5'-P-CCNC: 23 U/L (ref 0–45)
BASOPHILS # BLD AUTO: 0 10E9/L (ref 0–0.2)
BASOPHILS NFR BLD AUTO: 0.8 %
BILIRUB SERPL-MCNC: 0.6 MG/DL (ref 0.2–1.3)
BUN SERPL-MCNC: 18 MG/DL (ref 7–30)
CALCIUM SERPL-MCNC: 9 MG/DL (ref 8.5–10.1)
CHLORIDE SERPL-SCNC: 108 MMOL/L (ref 94–109)
CHOLEST SERPL-MCNC: 202 MG/DL
CO2 SERPL-SCNC: 27 MMOL/L (ref 20–32)
CREAT SERPL-MCNC: 1.06 MG/DL (ref 0.52–1.04)
DIFFERENTIAL METHOD BLD: NORMAL
EOSINOPHIL # BLD AUTO: 0.2 10E9/L (ref 0–0.7)
EOSINOPHIL NFR BLD AUTO: 3.1 %
ERYTHROCYTE [DISTWIDTH] IN BLOOD BY AUTOMATED COUNT: 12.7 % (ref 10–15)
GFR SERPL CREATININE-BSD FRML MDRD: 53 ML/MIN/{1.73_M2}
GLUCOSE SERPL-MCNC: 102 MG/DL (ref 70–99)
HCT VFR BLD AUTO: 42.5 % (ref 35–47)
HDLC SERPL-MCNC: 106 MG/DL
HGB BLD-MCNC: 14 G/DL (ref 11.7–15.7)
IMM GRANULOCYTES # BLD: 0 10E9/L (ref 0–0.4)
IMM GRANULOCYTES NFR BLD: 0.2 %
LDLC SERPL CALC-MCNC: 81 MG/DL
LYMPHOCYTES # BLD AUTO: 1.8 10E9/L (ref 0.8–5.3)
LYMPHOCYTES NFR BLD AUTO: 35.1 %
MCH RBC QN AUTO: 29.9 PG (ref 26.5–33)
MCHC RBC AUTO-ENTMCNC: 32.9 G/DL (ref 31.5–36.5)
MCV RBC AUTO: 91 FL (ref 78–100)
MONOCYTES # BLD AUTO: 0.4 10E9/L (ref 0–1.3)
MONOCYTES NFR BLD AUTO: 7.9 %
NEUTROPHILS # BLD AUTO: 2.8 10E9/L (ref 1.6–8.3)
NEUTROPHILS NFR BLD AUTO: 52.9 %
NONHDLC SERPL-MCNC: 96 MG/DL
NRBC # BLD AUTO: 0 10*3/UL
NRBC BLD AUTO-RTO: 0 /100
PLATELET # BLD AUTO: 266 10E9/L (ref 150–450)
POTASSIUM SERPL-SCNC: 4.2 MMOL/L (ref 3.4–5.3)
PROT SERPL-MCNC: 7.3 G/DL (ref 6.8–8.8)
RBC # BLD AUTO: 4.68 10E12/L (ref 3.8–5.2)
SODIUM SERPL-SCNC: 140 MMOL/L (ref 133–144)
TRIGL SERPL-MCNC: 75 MG/DL
WBC # BLD AUTO: 5.2 10E9/L (ref 4–11)

## 2020-06-30 ASSESSMENT — ENCOUNTER SYMPTOMS
SORE THROAT: 0
SINUS CONGESTION: 0
POOR WOUND HEALING: 0
TROUBLE SWALLOWING: 1
SMELL DISTURBANCE: 0
NAIL CHANGES: 0
SKIN CHANGES: 0
HOARSE VOICE: 0
NECK MASS: 0
TASTE DISTURBANCE: 0
SINUS PAIN: 0

## 2020-07-01 ENCOUNTER — OFFICE VISIT (OUTPATIENT)
Dept: INTERNAL MEDICINE | Facility: CLINIC | Age: 70
End: 2020-07-01
Payer: COMMERCIAL

## 2020-07-01 VITALS — DIASTOLIC BLOOD PRESSURE: 84 MMHG | HEART RATE: 79 BPM | OXYGEN SATURATION: 94 % | SYSTOLIC BLOOD PRESSURE: 148 MMHG

## 2020-07-01 DIAGNOSIS — E78.5 HYPERLIPIDEMIA, UNSPECIFIED HYPERLIPIDEMIA TYPE: ICD-10-CM

## 2020-07-01 DIAGNOSIS — Z12.11 SPECIAL SCREENING FOR MALIGNANT NEOPLASMS, COLON: Primary | ICD-10-CM

## 2020-07-01 RX ORDER — ROSUVASTATIN CALCIUM 20 MG/1
20 TABLET, COATED ORAL DAILY
Qty: 100 TABLET | Refills: 3 | Status: SHIPPED | OUTPATIENT
Start: 2020-07-01 | End: 2021-06-30

## 2020-07-01 ASSESSMENT — PAIN SCALES - GENERAL: PAINLEVEL: NO PAIN (0)

## 2020-07-01 NOTE — NURSING NOTE
Chief Complaint   Patient presents with     Physical     Pt is here for annual physical.     Cecilia Ellison LPN at 9:53 AM on 7/1/2020.

## 2020-07-01 NOTE — PROGRESS NOTES
HPI:   69yo F with PMH hyperlipidemia, HTN, CAD w/ prior MI, OA presents for physical.  She has been doing well.  She is exercising combination of strength and cardio training and tolerating this well.  She has managed to lose 15 pounds over the years and has steadily dropped her weight.  She has had no associated chest pain dyspnea or cardiac symptoms.  No problems on her present medication.  She uses Lunesta on a as needed basis to help her sleep but has been taking this fairly regularly.  She seen psychiatrist for this as well as anxiety and is taking bupropion as well.  No problems or side effects related to the rosuvastatin.      ROS:  Answers for HPI/ROS submitted by the patient on 6/30/2020   General Symptoms: No  HENT Symptoms: Yes  EYE SYMPTOMS: No  HEART SYMPTOMS: No  LUNG SYMPTOMS: No  INTESTINAL SYMPTOMS: No  URINARY SYMPTOMS: No  GYNECOLOGIC SYMPTOMS: No  BREAST SYMPTOMS: No  SKELETAL SYMPTOMS: No  BLOOD SYMPTOMS: No  NERVOUS SYSTEM SYMPTOMS: No  MENTAL HEALTH SYMPTOMS: No  Ear pain: No  Ear discharge: No  Hearing loss: Yes  Tinnitus: Yes  Nosebleeds: No  Congestion: No  Sinus pain: No   Voice hoarseness: No  Mouth sores: No  Sore throat: No  Tooth pain: No  Gum tenderness: No  Bleeding gums: No  Change in taste: No  Change in sense of smell: No  Dry mouth: No  Hearing aid used: No  Neck lump: No  Changes in hair: No  Changes in moles/birth marks: No  Itching: Yes  Rashes: No  Changes in nails: No  Acne: No  Hair in places you don't want it: No  Change in facial hair: No  Warts: No  Non-healing sores: No  Scarring: No  Flaking of skin: Yes  Color changes of hands/feet in cold : No  Sun sensitivity: Yes  Skin thickening: No      RHM:    Vaccinations:   Influenza   PPSV23 or 13?   Tetanus  Screening (breast, lung, prostate, skin, colon, etc)  Women: pap, exam, mammogram, DEXA: last well woman exam 12/2019. mammo 2017?  Diet  Exercise  Lifestyle: tobacco use: . Alcohol use: .   Sleep          Past  Medical History:   Diagnosis Date     Coronary artery disease 13    MI , had TPA and angioplasty and stent placement     Eczema      Heart attack      Hyperlipidemia      Hypertension      IGT (impaired glucose tolerance)      Osteoarthritis of both knees, unspecified osteoarthritis type 2018     Osteopenia     T score -1.3 in 2017     Pes planus      Psoriasis      Stented coronary artery      Past Surgical History:   Procedure Laterality Date     GYN SURGERY           HEART CATH, ANGIOPLASTY       KERATOTOMY ARCUATE WITH FEMTOSECOND LASER/IMAGING FOR ATIOL Right 2016    Procedure: KERATOTOMY ARCUATE WITH FEMTOSECOND LASER/IMAGING FOR ATIOL;  Surgeon: Dwain Pennington MD;  [facility]     KERATOTOMY ARCUATE WITH FEMTOSECOND LASER/IMAGING FOR ATIOL Left 2016    Procedure: KERATOTOMY ARCUATE WITH FEMTOSECOND LASER/IMAGING FOR ATIOL;  Surgeon: Dwain Pennington MD;  [facility]:  [facility]     PHACOEMULSIFICATION CLEAR CORNEA WITH STANDARD INTRAOCULAR LENS IMPLANT Right 2016    Procedure: PHACOEMULSIFICATION CLEAR CORNEA WITH STANDARD INTRAOCULAR LENS IMPLANT;  Surgeon: Dwain Pennington MD;  [facility]:  [facility]     PHACOEMULSIFICATION CLEAR CORNEA WITH STANDARD INTRAOCULAR LENS IMPLANT Left 2016    Procedure: PHACOEMULSIFICATION CLEAR CORNEA WITH STANDARD INTRAOCULAR LENS IMPLANT;  Surgeon: Dwain Pennington MD;  [facility]:  [facility]     Family History   Problem Relation Age of Onset     Neurologic Disorder Mother         Parkinsonism     C.A.D. Father         CABG     Breast Cancer Paternal Aunt      Diabetes Maternal Grandmother      Breast Cancer Paternal Uncle      Social History     Tobacco Use     Smoking status: Never Smoker     Smokeless tobacco: Never Used   Substance Use Topics     Alcohol use: Yes     Alcohol/week: 2.5 standard drinks     Types: 3 Standard drinks or equivalent per week     Drug use: No     Current Outpatient Medications   Medication Sig Dispense Refill      albuterol (PROAIR HFA, PROVENTIL HFA, VENTOLIN HFA) 108 (90 BASE) MCG/ACT inhaler Inhale 2 puffs into the lungs 4 times daily As needed for exercise in the cold 1 Inhaler 2     aspirin (ASPIRIN) 81 MG EC tablet Take 2 tablets (162 mg) by mouth daily 180 tablet 3     azelastine (ASTELIN) 0.1 % nasal spray        BUPROPION HCL ER, XL, PO Take 450 mg by mouth daily       clobetasol (TEMOVATE) 0.05 % external solution        Coenzyme Q10 (COQ10) 100 MG CAPS Take 200 mg by mouth daily        desonide (DESOWEN) 0.05 % cream        estradiol (VAGIFEM) 10 MCG TABS Place 10 mcg vaginally Place vaginally 3 times a week.       eszopiclone (LUNESTA) 2 MG tablet Take 1 tablet by mouth nightly as needed.       IBUPROFEN IB OR Take  by mouth. PRN       ketoconazole (NIZORAL) 2 % cream Apply topically 2 times daily 15 g 3     NAPROXEN PO Take  by mouth. PRN       nitroGLYcerin (NITROSTAT) 0.4 MG sublingual tablet Place 1 tablet (0.4 mg) under the tongue every 5 minutes as needed 25 tablet 0     rosuvastatin (CRESTOR) 20 MG tablet Take 1 tablet (20 mg) by mouth daily 100 tablet 0     tacrolimus (PROTOPIC) 0.1 % ointment           Allergies   Allergen Reactions     Ambien      Restless leg,  Wild dreams     Amoxicillin Rash     Mold Rash     Bloody nose             LMP 12/01/2013   Physical Examination:  PHYSICAL EXAMINATION:   The patient is alert, oriented with a clear sensorium.   Skin shows no lesions or rashes and good turgor.   Head is normocephalic and atraumatic.   Eyes show PERRLA. Fundi are benign.   Ears show normal TMs bilaterally.   Mouth shows clear oral mucosa.   Neck shows no nodes, thyromegaly or bruits.   Back is nontender.   Lungs are clear to percussion and auscultation.   Heart shows normal S1 and S2 without murmur or gallop.   Abdomen is obese. soft, nontender without masses or organomegaly.   Extremities show no edema and no evidence of active synovitis.   Neurologic examination shows cranial nerves II-XII  intact. Motor shows normal strength. Reflexes are full and symmetrical.     Labs reviewed with her:  Results for CORTNEY DUKES (MRN [id number]) as of 7/1/2020 10:55   Ref. Range 6/30/2020 07:52   Sodium Latest Ref Range: 133 - 144 mmol/L 140   Potassium Latest Ref Range: 3.4 - 5.3 mmol/L 4.2   Chloride Latest Ref Range: 94 - 109 mmol/L 108   Carbon Dioxide Latest Ref Range: 20 - 32 mmol/L 27   Urea Nitrogen Latest Ref Range: 7 - 30 mg/dL 18   Creatinine Latest Ref Range: 0.52 - 1.04 mg/dL 1.06 (H)   GFR Estimate Latest Ref Range: >60 mL/min/1.73_m2 53 (L)   GFR Estimate If Black Latest Ref Range: >60 mL/min/1.73_m2 62   Calcium Latest Ref Range: 8.5 - 10.1 mg/dL 9.0   Anion Gap Latest Ref Range: 3 - 14 mmol/L 5   Albumin Latest Ref Range: 3.4 - 5.0 g/dL 4.0   Protein Total Latest Ref Range: 6.8 - 8.8 g/dL 7.3   Bilirubin Total Latest Ref Range: 0.2 - 1.3 mg/dL 0.6   Alkaline Phosphatase Latest Ref Range: 40 - 150 U/L 74   ALT Latest Ref Range: 0 - 50 U/L 36   AST Latest Ref Range: 0 - 45 U/L 23   Cholesterol Latest Ref Range: <200 mg/dL 202 (H)   HDL Cholesterol Latest Ref Range: >49 mg/dL 106   LDL Cholesterol Calculated Latest Ref Range: <100 mg/dL 81   Non HDL Cholesterol Latest Ref Range: <130 mg/dL 96   Triglycerides Latest Ref Range: <150 mg/dL 75   Glucose Latest Ref Range: 70 - 99 mg/dL 102 (H)   WBC Latest Ref Range: 4.0 - 11.0 10e9/L 5.2   Hemoglobin Latest Ref Range: 11.7 - 15.7 g/dL 14.0   Hematocrit Latest Ref Range: 35.0 - 47.0 % 42.5   Platelet Count Latest Ref Range: 150 - 450 10e9/L 266   RBC Count Latest Ref Range: 3.8 - 5.2 10e12/L 4.68   MCV Latest Ref Range: 78 - 100 fl 91   MCH Latest Ref Range: 26.5 - 33.0 pg 29.9   MCHC Latest Ref Range: 31.5 - 36.5 g/dL 32.9   RDW Latest Ref Range: 10.0 - 15.0 % 12.7   Diff Method Unknown Automated Method   % Neutrophils Latest Units: % 52.9   % Lymphocytes Latest Units: % 35.1   % Monocytes Latest Units: % 7.9   % Eosinophils Latest Units: % 3.1   %  Basophils Latest Units: % 0.8   % Immature Granulocytes Latest Units: % 0.2   Nucleated RBCs Latest Ref Range: 0 /100 0   Absolute Neutrophil Latest Ref Range: 1.6 - 8.3 10e9/L 2.8   Absolute Lymphocytes Latest Ref Range: 0.8 - 5.3 10e9/L 1.8       A&P:  1 hyperlipidemia controlled on rosuvastatin  2 history of coronary artery disease asymptomatic  3 osteoarthritis of the knees  4 whitecoat hypertension    Plan  We discussed nonpharmacologic blood pressure control measures will do annual FIT testing for colon cancer screening have her follow-up for reassessment in a year and continue the same rosuvastatin    Matthew Centeno MD

## 2020-07-08 DIAGNOSIS — Z12.11 SPECIAL SCREENING FOR MALIGNANT NEOPLASMS, COLON: ICD-10-CM

## 2020-07-08 PROCEDURE — 82274 ASSAY TEST FOR BLOOD FECAL: CPT | Performed by: INTERNAL MEDICINE

## 2020-07-20 LAB — HEMOCCULT STL QL IA: NEGATIVE

## 2020-12-20 ENCOUNTER — HEALTH MAINTENANCE LETTER (OUTPATIENT)
Age: 70
End: 2020-12-20

## 2021-06-27 ASSESSMENT — ENCOUNTER SYMPTOMS
SINUS CONGESTION: 0
ARTHRALGIAS: 0
SORE THROAT: 0
BACK PAIN: 0
MYALGIAS: 0
SMELL DISTURBANCE: 0
MUSCLE CRAMPS: 0
TASTE DISTURBANCE: 0
NECK PAIN: 0
TROUBLE SWALLOWING: 0
STIFFNESS: 0
SKIN CHANGES: 0
HOARSE VOICE: 0
MUSCLE WEAKNESS: 0
JOINT SWELLING: 0
SINUS PAIN: 0
POOR WOUND HEALING: 0
NECK MASS: 0
NAIL CHANGES: 0

## 2021-06-30 ENCOUNTER — OFFICE VISIT (OUTPATIENT)
Dept: INTERNAL MEDICINE | Facility: CLINIC | Age: 71
End: 2021-06-30
Payer: COMMERCIAL

## 2021-06-30 VITALS
BODY MASS INDEX: 28.76 KG/M2 | DIASTOLIC BLOOD PRESSURE: 82 MMHG | SYSTOLIC BLOOD PRESSURE: 149 MMHG | OXYGEN SATURATION: 97 % | WEIGHT: 189.13 LBS | HEART RATE: 74 BPM

## 2021-06-30 DIAGNOSIS — I25.10 CORONARY ARTERY DISEASE INVOLVING NATIVE HEART WITHOUT ANGINA PECTORIS, UNSPECIFIED VESSEL OR LESION TYPE: ICD-10-CM

## 2021-06-30 DIAGNOSIS — E01.0 THYROMEGALY: ICD-10-CM

## 2021-06-30 DIAGNOSIS — H90.6 MIXED CONDUCTIVE AND SENSORINEURAL HEARING LOSS OF BOTH EARS: ICD-10-CM

## 2021-06-30 DIAGNOSIS — E78.5 HYPERLIPIDEMIA, UNSPECIFIED HYPERLIPIDEMIA TYPE: ICD-10-CM

## 2021-06-30 DIAGNOSIS — E78.5 HYPERLIPIDEMIA, UNSPECIFIED HYPERLIPIDEMIA TYPE: Primary | ICD-10-CM

## 2021-06-30 DIAGNOSIS — Z12.11 SPECIAL SCREENING FOR MALIGNANT NEOPLASMS, COLON: ICD-10-CM

## 2021-06-30 LAB
ALBUMIN SERPL-MCNC: 4.2 G/DL (ref 3.4–5)
ALP SERPL-CCNC: 72 U/L (ref 40–150)
ALT SERPL W P-5'-P-CCNC: 33 U/L (ref 0–50)
ANION GAP SERPL CALCULATED.3IONS-SCNC: 8 MMOL/L (ref 3–14)
AST SERPL W P-5'-P-CCNC: 18 U/L (ref 0–45)
BASOPHILS # BLD AUTO: 0 10E9/L (ref 0–0.2)
BASOPHILS NFR BLD AUTO: 0.4 %
BILIRUB SERPL-MCNC: 0.5 MG/DL (ref 0.2–1.3)
BUN SERPL-MCNC: 18 MG/DL (ref 7–30)
CALCIUM SERPL-MCNC: 9.6 MG/DL (ref 8.5–10.1)
CHLORIDE SERPL-SCNC: 107 MMOL/L (ref 94–109)
CHOLEST SERPL-MCNC: 189 MG/DL
CO2 SERPL-SCNC: 26 MMOL/L (ref 20–32)
CREAT SERPL-MCNC: 1.12 MG/DL (ref 0.52–1.04)
DIFFERENTIAL METHOD BLD: NORMAL
EOSINOPHIL # BLD AUTO: 0.1 10E9/L (ref 0–0.7)
EOSINOPHIL NFR BLD AUTO: 1.3 %
ERYTHROCYTE [DISTWIDTH] IN BLOOD BY AUTOMATED COUNT: 12.6 % (ref 10–15)
GFR SERPL CREATININE-BSD FRML MDRD: 50 ML/MIN/{1.73_M2}
GLUCOSE SERPL-MCNC: 103 MG/DL (ref 70–99)
HCT VFR BLD AUTO: 43.1 % (ref 35–47)
HDLC SERPL-MCNC: 88 MG/DL
HGB BLD-MCNC: 14.2 G/DL (ref 11.7–15.7)
IMM GRANULOCYTES # BLD: 0 10E9/L (ref 0–0.4)
IMM GRANULOCYTES NFR BLD: 0.2 %
LDLC SERPL CALC-MCNC: 87 MG/DL
LYMPHOCYTES # BLD AUTO: 1.3 10E9/L (ref 0.8–5.3)
LYMPHOCYTES NFR BLD AUTO: 24.3 %
MCH RBC QN AUTO: 29.7 PG (ref 26.5–33)
MCHC RBC AUTO-ENTMCNC: 32.9 G/DL (ref 31.5–36.5)
MCV RBC AUTO: 90 FL (ref 78–100)
MONOCYTES # BLD AUTO: 0.4 10E9/L (ref 0–1.3)
MONOCYTES NFR BLD AUTO: 7.9 %
NEUTROPHILS # BLD AUTO: 3.6 10E9/L (ref 1.6–8.3)
NEUTROPHILS NFR BLD AUTO: 65.9 %
NONHDLC SERPL-MCNC: 102 MG/DL
NRBC # BLD AUTO: 0 10*3/UL
NRBC BLD AUTO-RTO: 0 /100
PLATELET # BLD AUTO: 238 10E9/L (ref 150–450)
POTASSIUM SERPL-SCNC: 4.1 MMOL/L (ref 3.4–5.3)
PROT SERPL-MCNC: 7.4 G/DL (ref 6.8–8.8)
RBC # BLD AUTO: 4.78 10E12/L (ref 3.8–5.2)
SODIUM SERPL-SCNC: 140 MMOL/L (ref 133–144)
TRIGL SERPL-MCNC: 70 MG/DL
TSH SERPL DL<=0.005 MIU/L-ACNC: 2.08 MU/L (ref 0.4–4)
WBC # BLD AUTO: 5.4 10E9/L (ref 4–11)

## 2021-06-30 PROCEDURE — 80053 COMPREHEN METABOLIC PANEL: CPT | Performed by: PATHOLOGY

## 2021-06-30 PROCEDURE — 80061 LIPID PANEL: CPT | Performed by: PATHOLOGY

## 2021-06-30 PROCEDURE — 84443 ASSAY THYROID STIM HORMONE: CPT | Performed by: PATHOLOGY

## 2021-06-30 PROCEDURE — 90471 IMMUNIZATION ADMIN: CPT | Performed by: INTERNAL MEDICINE

## 2021-06-30 PROCEDURE — G0439 PPPS, SUBSEQ VISIT: HCPCS | Performed by: INTERNAL MEDICINE

## 2021-06-30 PROCEDURE — 90715 TDAP VACCINE 7 YRS/> IM: CPT | Performed by: INTERNAL MEDICINE

## 2021-06-30 PROCEDURE — 36415 COLL VENOUS BLD VENIPUNCTURE: CPT | Performed by: PATHOLOGY

## 2021-06-30 PROCEDURE — 85025 COMPLETE CBC W/AUTO DIFF WBC: CPT | Performed by: PATHOLOGY

## 2021-06-30 RX ORDER — ROSUVASTATIN CALCIUM 20 MG/1
20 TABLET, COATED ORAL DAILY
Qty: 100 TABLET | Refills: 3 | Status: SHIPPED | OUTPATIENT
Start: 2021-06-30 | End: 2022-08-15

## 2021-06-30 NOTE — PROGRESS NOTES
HPI  71-year-old presents today for physical examination.  She has been exercising aggressively doing a combination of strength and cardiovascular training.  She is feeling stronger and doing well in this regard.  She is following a low-carb diet for the most part but has increased her post exercise carbs to try and aid in recovery.  She injured her left second toe recently but that has been improving gradually and the swelling and discomfort has subsided.  She has noted some decreased hearing recently as well as some mild tinnitus.  Otherwise she has had no problems on her present medications no chest pain or dyspnea associated with her exercise activity.  Past Medical History:   Diagnosis Date     Coronary artery disease 13    MI , had TPA and angioplasty and stent placement     Eczema      Heart attack (H)      Hyperlipidemia      Hypertension      IGT (impaired glucose tolerance)      Osteoarthritis of both knees, unspecified osteoarthritis type 2018     Osteopenia     T score -1.3 in 2017     Pes planus      Psoriasis      Stented coronary artery      Past Surgical History:   Procedure Laterality Date     GYN SURGERY           HEART CATH, ANGIOPLASTY       KERATOTOMY ARCUATE WITH FEMTOSECOND LASER/IMAGING FOR ATIOL Right 2016    Procedure: KERATOTOMY ARCUATE WITH FEMTOSECOND LASER/IMAGING FOR ATIOL;  Surgeon: Dwain Pennington MD;  [facility]     KERATOTOMY ARCUATE WITH FEMTOSECOND LASER/IMAGING FOR ATIOL Left 2016    Procedure: KERATOTOMY ARCUATE WITH FEMTOSECOND LASER/IMAGING FOR ATIOL;  Surgeon: Dwain Pennington MD;  [facility]  [facility]     PHACOEMULSIFICATION CLEAR CORNEA WITH STANDARD INTRAOCULAR LENS IMPLANT Right 2016    Procedure: PHACOEMULSIFICATION CLEAR CORNEA WITH STANDARD INTRAOCULAR LENS IMPLANT;  Surgeon: Dwain Pennington MD;  [facility]:  [facility]     PHACOEMULSIFICATION CLEAR CORNEA WITH STANDARD INTRAOCULAR LENS IMPLANT Left 2016    Procedure:  PHACOEMULSIFICATION CLEAR CORNEA WITH STANDARD INTRAOCULAR LENS IMPLANT;  Surgeon: Dwain Pennington MD;  Location: [facility]     Family History   Problem Relation Age of Onset     Neurologic Disorder Mother         Parkinsonism     C.A.D. Father         CABG     Breast Cancer Paternal Aunt      Diabetes Maternal Grandmother      Breast Cancer Paternal Uncle      Social History     Socioeconomic History     Marital status:      Spouse name: Not on file     Number of children: Not on file     Years of education: Not on file     Highest education level: Not on file   Occupational History     Not on file   Social Needs     Financial resource strain: Not on file     Food insecurity     Worry: Not on file     Inability: Not on file     Transportation needs     Medical: Not on file     Non-medical: Not on file   Tobacco Use     Smoking status: Never Smoker     Smokeless tobacco: Never Used   Substance and Sexual Activity     Alcohol use: Yes     Alcohol/week: 2.5 standard drinks     Types: 3 Standard drinks or equivalent per week     Drug use: No     Sexual activity: Yes     Partners: Male   Lifestyle     Physical activity     Days per week: Not on file     Minutes per session: Not on file     Stress: Not on file   Relationships     Social connections     Talks on phone: Not on file     Gets together: Not on file     Attends Buddhist service: Not on file     Active member of club or organization: Not on file     Attends meetings of clubs or organizations: Not on file     Relationship status: Not on file     Intimate partner violence     Fear of current or ex partner: Not on file     Emotionally abused: Not on file     Physically abused: Not on file     Forced sexual activity: Not on file   Other Topics Concern     Parent/sibling w/ CABG, MI or angioplasty before 65F 55M? Not Asked      Service Not Asked     Blood Transfusions Not Asked     Caffeine Concern Not Asked     Occupational Exposure Not Asked     Hobby  Hazards Not Asked     Sleep Concern Not Asked     Stress Concern Not Asked     Weight Concern Not Asked     Special Diet Yes     Comment: low carb, no wheat     Back Care Not Asked     Exercise Yes     Comment: swims 6x/wk     Bike Helmet Not Asked     Seat Belt Not Asked     Self-Exams Not Asked   Social History Narrative     Not on file     Answers for HPI/ROS submitted by the patient on 6/27/2021   General Symptoms: No  Skin Symptoms: Yes  HENT Symptoms: Yes  EYE SYMPTOMS: No  HEART SYMPTOMS: No  LUNG SYMPTOMS: No  INTESTINAL SYMPTOMS: No  URINARY SYMPTOMS: No  GYNECOLOGIC SYMPTOMS: No  BREAST SYMPTOMS: No  SKELETAL SYMPTOMS: Yes  BLOOD SYMPTOMS: No  NERVOUS SYSTEM SYMPTOMS: No  MENTAL HEALTH SYMPTOMS: No  Changes in hair: No  Changes in moles/birth marks: No  Itching: No  Rashes: No  Changes in nails: No  Acne: No  Hair in places you don't want it: No  Change in facial hair: No  Warts: No  Non-healing sores: No  Scarring: No  Flaking of skin: Yes  Color changes of hands/feet in cold : No  Sun sensitivity: Yes  Skin thickening: No  Ear pain: No  Ear discharge: No  Hearing loss: Yes  Tinnitus: Yes  Nosebleeds: No  Congestion: No  Sinus pain: No  Trouble swallowing: No   Voice hoarseness: No  Mouth sores: No  Sore throat: No  Tooth pain: No  Gum tenderness: No  Bleeding gums: No  Change in taste: No  Change in sense of smell: No  Dry mouth: No  Hearing aid used: No  Neck lump: No  Back pain: No  Muscle aches: No  Neck pain: No  Swollen joints: No  Joint pain: No  Bone pain: No  Muscle cramps: No  Muscle weakness: No  Joint stiffness: No  Bone fracture: No    Exam:  BP (!) 149/82 (BP Location: Left arm, Patient Position: Sitting, Cuff Size: Adult Regular)   Pulse 74   Wt 85.8 kg (189 lb 2 oz)   LMP 12/01/2013   SpO2 97%   BMI 28.76 kg/m    189 lbs 2 oz  PHYSICAL EXAMINATION:   The patient is alert, oriented with a clear sensorium.   Skin shows no lesions or rashes and good turgor.   Head is normocephalic and  atraumatic.   Eyes show PERRLA.  Ears show normal TMs bilaterally.   Mouth shows clear oral mucosa.   Neck shows no nodes, evidence of diffuse thyromegaly no bruits.   Back is nontender.   Lungs are clear to percussion and auscultation.   Heart shows normal S1 and S2 without murmur or gallop.   Abdomen is soft, nontender without masses or organomegaly.   Extremities show no edema and no evidence of active synovitis.  Her left second toe showed some swelling over the proximal phalanx minimal tenderness and no evidence of joint effusion  Neurologic examination shows cranial nerves II-XII intact. Motor shows normal strength. Reflexes are full and symmetrical.       ASSESSMENT  1 hyperlipidemia on rosuvastatin  2 history of coronary artery disease asymptomatic  3 osteoarthritis of the knees stable  4 hypertension  5 Left 2nd toe contusion  6 diffuse thyromegaly    Plan  We will check her blood test today and refer for audiology.  She is doing annual FIT testing for colon cancer screening.  Renewed her medications and will have her reassessed in a year.    This note was completed using Dragon voice recognition software.      Matthew Centeno MD  General Internal Medicine  [facility]  [phone]

## 2021-06-30 NOTE — PROGRESS NOTES
Medicare Annual Wellness Visit Previsit note    This 71 year old year old female presents for a  Medicare Wellness Exam.           Medical Care     Have you been to an ER or a hospital in the last year? No  What other specialists or organizations are involved in your medical care?  [facility]    Current providers sharing in care for this patient include:  Patient Care Team       Relationship Specialty Notifications Start End    Matthew Centeno MD PCP - General Internal Medicine  7/11/14     Phone: [phone] Fax: [phone] [address] [facility] [address]    Matthew Centeno MD Assigned PCP   5/27/21     Phone: [phone] Fax: [phone] [address] [facility] [address]                 Social History     Marital Status:  Who lives in your household?   Does your home have any of the following safety concerns? Loose rugs in the hallway, no grab bars in the bathroom, no handrails on the stairs or have poorly lit areas?  No  Do you feel threatened or controlled by a partner, ex-partner or anyone in your life? No  Has anyone hurt you physically, for example by pushing, hitting, slapping or kicking you   or forcing you to have sex? No  Do you need help with the phone, transportation, shopping, preparing meals, housework, laundry, medications or managing money? No   Have you noticed any hearing difficulties? Yes      Risk Behaviors and Healthy Habits     How many servings of fruits and vegetables do you eat a day? 3-5  How often do you exercise and what do you do? 60 minutes 4x a week  Do you frequently ride without a seatbelt? No  Do you use tobacco?  No  Do you use any other drugs? No       Do you use alcohol?Yes  Number of drinks per day 1  Number of drinking days a week 3      Sexual Health     Are you sexually active? Yes   If yes, with men, women, or both? Male  If yes, do you more than one current partner?No  If yes, do you use condoms? No  Have you  had any sexually transmitted infections? No  Any sexual concerns? No       FOR WOMEN ONLY  What year did you stop having periods? 2013  Any vaginal bleeding in the last year? No  Have you ever had an abnormal Pap smear? No    [place] Sarabia EMT at 10:14 AM on 6/30/2021

## 2021-06-30 NOTE — PROGRESS NOTES
Patient received TDAP vaccine. Vaccine was given under the supervision of Dr. Matthew Centeno. See immunization list for administration details. Pt was advised to remain in [facility] lobby for 15 minutes in case of an averse reaction.     Given by EDMUND Velez at 11:13 AM on 6/30/2021

## 2021-07-01 DIAGNOSIS — Z12.11 SPECIAL SCREENING FOR MALIGNANT NEOPLASMS, COLON: ICD-10-CM

## 2021-07-01 PROCEDURE — 82274 ASSAY TEST FOR BLOOD FECAL: CPT | Performed by: INTERNAL MEDICINE

## 2021-07-04 LAB — HEMOCCULT STL QL IA: NEGATIVE

## 2021-08-17 ENCOUNTER — OFFICE VISIT (OUTPATIENT)
Dept: AUDIOLOGY | Facility: CLINIC | Age: 71
End: 2021-08-17
Attending: INTERNAL MEDICINE
Payer: COMMERCIAL

## 2021-08-17 DIAGNOSIS — H90.6 MIXED CONDUCTIVE AND SENSORINEURAL HEARING LOSS OF BOTH EARS: ICD-10-CM

## 2021-08-17 DIAGNOSIS — H90.3 SENSORINEURAL HEARING LOSS (SNHL), BILATERAL: Primary | ICD-10-CM

## 2021-08-17 PROCEDURE — 92550 TYMPANOMETRY & REFLEX THRESH: CPT | Performed by: AUDIOLOGIST

## 2021-08-17 PROCEDURE — 92557 COMPREHENSIVE HEARING TEST: CPT | Performed by: AUDIOLOGIST

## 2021-08-17 NOTE — PROGRESS NOTES
AUDIOLOGY REPORT    SUBJECTIVE:  Bridget Reeves is a 71 year old female who was seen in Audiology at the [facility], [facility] for audiologic evaluation. The patient reports issues with communicating in the presence of background noise and issues with understanding speech on the television with a volume that is comfortable for others. The patient reports a hearing evaluation completed 10-12 years ago indicated normal hearing bilaterally and the patient does not feel that her hearing has declined significantly since that time. The patient reports the presence of bilateral episodic tinnitus that has become more frequent recently. 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 The patient reports that her father developed hearing loss in later age but she attributes this to a history of severe noise exposure. The patient denies other ear related issues, dizziness or history of chronic ear issues or ear surgeries.    OBJECTIVE:  Fall Risk Screen:  1. Have you fallen two or more times in the past year? No  2. Have you fallen and had an injury in the past year? No    Abuse Screening:  Do you feel unsafe at home or work/school? No  Do you feel threatened by someone? No  Does anyone try to keep you from having contact with others, or doing things outside of your home? No  Physical signs of abuse present? No    Otoscopic exam indicates ears are clear of cerumen bilaterally     Pure Tone Thresholds assessed using conventional audiometry with good  reliability from 250-8000 Hz bilaterally using circumaural headphones and reassessed using insert earphones    RIGHT:  Borderline normal sloping to moderately severe sensorineural hearing loss     LEFT:    Borderline normal sloping to moderately severe sensorineural hearing loss     Tympanogram:    RIGHT: normal eardrum mobility    LEFT:   normal eardrum mobility    Reflexes (reported by stimulus  ear):  RIGHT: Ipsilateral is present at normal levels  RIGHT: Contralateral is present at normal levels  LEFT:   Ipsilateral is present at normal levels  LEFT:   Contralateral is present at normal levels    Speech Reception Threshold:    RIGHT: 25 dB HL    LEFT:   20 dB HL  Word Recognition Score:     RIGHT: 100% at 65 dB HL using NU-6 recorded word list.    LEFT:   100% at 60 dB HL using NU-6 recorded word list.    ASSESSMENT:  Borderline normal sloping to moderately severe sensorineural hearing loss bilaterally. Today s results were discussed with the patient in detail.     PLAN:    Patient was counseled regarding hearing loss and impact on communication.  Patient is a borderline candidate for hearing amplification at this time.The patient should schedule for a hearing aid consultation to be educated on options to help aid in communication as desired. It is recommended that the patient return at least bi-annually for monitoring of hearing status, sooner if changes in hearing or ear related issues are noted.  Please call this clinic with questions regarding these results or recommendations.      Kb Donovan.  Licensed Audiologist  [place] #[id number]

## 2021-10-03 ENCOUNTER — HEALTH MAINTENANCE LETTER (OUTPATIENT)
Age: 71
End: 2021-10-03

## 2022-01-05 ENCOUNTER — TELEPHONE (OUTPATIENT)
Dept: INTERNAL MEDICINE | Facility: CLINIC | Age: 72
End: 2022-01-05
Payer: COMMERCIAL

## 2022-01-05 NOTE — TELEPHONE ENCOUNTER
[facility] Call Center    Phone Message    May a detailed message be left on voicemail: yes     Reason for Call: Other: Cristobal calling to inquire about how to update the patient's my chart records with the patient's vaccine record. Please call to discuss. Thank you.      Action Taken: Message routed to:  [facility]    Travel Screening: Not Applicable

## 2022-07-09 ENCOUNTER — HEALTH MAINTENANCE LETTER (OUTPATIENT)
Age: 72
End: 2022-07-09

## 2022-07-20 ENCOUNTER — TELEPHONE (OUTPATIENT)
Dept: INTERNAL MEDICINE | Facility: CLINIC | Age: 72
End: 2022-07-20

## 2022-07-20 NOTE — TELEPHONE ENCOUNTER
[facility] Call Center    Phone Message    May a detailed message be left on voicemail: yes     Reason for Call:  The patient wife took the At Home Rapid Covid Test and it turned out to be Positive as of Today, the  was calling to cancel appointment, I informed him about the protocols to notify the clinic of the patient recent Covid test result and they will follow up with necessity reasons for coming in or not and testing and treatment options I needed thank you.      Action Taken: Message routed to:  [facility]    Travel Screening: Not Applicable

## 2022-07-21 ENCOUNTER — TELEPHONE (OUTPATIENT)
Dept: INTERNAL MEDICINE | Facility: CLINIC | Age: 72
End: 2022-07-21

## 2022-07-21 NOTE — TELEPHONE ENCOUNTER
[facility] Call Center    Phone Message    May a detailed message be left on voicemail: yes     Reason for Call: Symptoms or Concerns     Current symptom or concern: Patient tested positive for COVID yesterday 07/20/2022. Symptoms started earlier this week. Fully immunized and had 2 boosters. Feels fine. Doesn t think she is sick enough to ask for COVID treatment. Informed patient about 5 day timeframe for video visit with a provider. Patient voiced understanding.      Symptoms have been present for:  3 day(s)    Has patient previously been seen for this? No      Are there any new or worsening symptoms? Yes: COVID positive.      Action Taken: Message routed to:  [facility]    Travel Screening: Not Applicable                                                                       Writer called [employer]. States the usage area on form is not complete. They will fax another form.    Form placed in Dr Tejada's mail slot.     Please complete: ICD 10 code, Signature and have RN or MA fax back to [facility] pharmacy.    Thank you

## 2022-08-10 DIAGNOSIS — E78.5 HYPERLIPIDEMIA, UNSPECIFIED HYPERLIPIDEMIA TYPE: ICD-10-CM

## 2022-08-12 NOTE — TELEPHONE ENCOUNTER
[facility] Call Center    Phone Message    May a detailed message be left on voicemail: yes     Reason for Call: Other: patient is still waiting for her medication to be sent to her pharmacy. Please call her to let her know that the prescription has been sent to her pharmacy      Action Taken: Message routed to:  [facility]    Travel Screening: Not Applicable

## 2022-08-13 ENCOUNTER — MYC MEDICAL ADVICE (OUTPATIENT)
Dept: INTERNAL MEDICINE | Facility: CLINIC | Age: 72
End: 2022-08-13

## 2022-08-15 ENCOUNTER — MYC REFILL (OUTPATIENT)
Dept: INTERNAL MEDICINE | Facility: CLINIC | Age: 72
End: 2022-08-15

## 2022-08-15 ENCOUNTER — MYC MEDICAL ADVICE (OUTPATIENT)
Dept: INTERNAL MEDICINE | Facility: CLINIC | Age: 72
End: 2022-08-15

## 2022-08-15 DIAGNOSIS — E78.5 HYPERLIPIDEMIA, UNSPECIFIED HYPERLIPIDEMIA TYPE: ICD-10-CM

## 2022-08-15 RX ORDER — ROSUVASTATIN CALCIUM 20 MG/1
20 TABLET, COATED ORAL DAILY
Qty: 30 TABLET | Refills: 0 | Status: CANCELLED | OUTPATIENT
Start: 2022-08-15

## 2022-08-15 RX ORDER — ROSUVASTATIN CALCIUM 20 MG/1
20 TABLET, COATED ORAL DAILY
Qty: 30 TABLET | Refills: 0 | Status: SHIPPED | OUTPATIENT
Start: 2022-08-15 | End: 2022-09-13

## 2022-08-15 NOTE — TELEPHONE ENCOUNTER
Rosuvastatin Calcium Oral Tablet 20 MG  Last Written Prescription Date:  6/30/2021  Last Fill Quantity: 100,   # refills: 3  Last Office Visit :  6/30/2021  Future Office visit:   9/13/2022  30 Days sent to pharm to cover Pt until office visit in Sept.       Joann Tran RN  Central Triage Red Flags/Med Refills

## 2022-08-15 NOTE — TELEPHONE ENCOUNTER
Duplicate encounter.     Garry Shay, EMT at 11:28 AM on 8/15/2022.  Primary Care Clinic: [phone]

## 2022-08-15 NOTE — TELEPHONE ENCOUNTER
[facility] Call Center    Phone Message    May a detailed message be left on voicemail: yes     Reason for Call: Medication Refill Request    Has the patient contacted the pharmacy for the refill? Yes   Name of medication being requested: Crestor   Provider who prescribed the medication: Dr Centeno  Pharmacy:      [id number] - [place], [place] - [address] [facility]    Date medication is needed: ASAP  Patient has been waiting since 8/10/2022. She is going out of town on 8/18/2022 she needs to get this before then. Please call patient when the medication has been sent over to the pharmacy.          Action Taken: Message routed to:  [facility]    Travel Screening: Not Applicable

## 2022-09-04 ENCOUNTER — HEALTH MAINTENANCE LETTER (OUTPATIENT)
Age: 72
End: 2022-09-04

## 2022-09-08 ASSESSMENT — ENCOUNTER SYMPTOMS
MUSCLE CRAMPS: 0
JOINT SWELLING: 0
NAIL CHANGES: 0
NECK PAIN: 0
POOR WOUND HEALING: 0
BACK PAIN: 1
SKIN CHANGES: 0
STIFFNESS: 0
MYALGIAS: 1
ARTHRALGIAS: 0
MUSCLE WEAKNESS: 0

## 2022-09-13 ENCOUNTER — OFFICE VISIT (OUTPATIENT)
Dept: INTERNAL MEDICINE | Facility: CLINIC | Age: 72
End: 2022-09-13

## 2022-09-13 ENCOUNTER — LAB (OUTPATIENT)
Dept: LAB | Facility: CLINIC | Age: 72
End: 2022-09-13
Payer: COMMERCIAL

## 2022-09-13 VITALS
HEART RATE: 87 BPM | SYSTOLIC BLOOD PRESSURE: 156 MMHG | OXYGEN SATURATION: 95 % | DIASTOLIC BLOOD PRESSURE: 85 MMHG | BODY MASS INDEX: 30.04 KG/M2 | WEIGHT: 197.6 LBS

## 2022-09-13 DIAGNOSIS — M89.9 DISORDER OF BONE AND CARTILAGE: ICD-10-CM

## 2022-09-13 DIAGNOSIS — R73.02 IGT (IMPAIRED GLUCOSE TOLERANCE): ICD-10-CM

## 2022-09-13 DIAGNOSIS — M94.9 DISORDER OF BONE AND CARTILAGE: ICD-10-CM

## 2022-09-13 DIAGNOSIS — I25.10 CORONARY ARTERY DISEASE INVOLVING NATIVE HEART WITHOUT ANGINA PECTORIS, UNSPECIFIED VESSEL OR LESION TYPE: ICD-10-CM

## 2022-09-13 DIAGNOSIS — E78.5 HYPERLIPIDEMIA, UNSPECIFIED HYPERLIPIDEMIA TYPE: ICD-10-CM

## 2022-09-13 DIAGNOSIS — Z12.11 SPECIAL SCREENING FOR MALIGNANT NEOPLASMS, COLON: ICD-10-CM

## 2022-09-13 DIAGNOSIS — E78.5 HYPERLIPIDEMIA, UNSPECIFIED HYPERLIPIDEMIA TYPE: Primary | ICD-10-CM

## 2022-09-13 DIAGNOSIS — Z12.31 ENCOUNTER FOR SCREENING MAMMOGRAM FOR BREAST CANCER: ICD-10-CM

## 2022-09-13 DIAGNOSIS — R03.0 WHITE COAT SYNDROME WITHOUT HYPERTENSION: ICD-10-CM

## 2022-09-13 DIAGNOSIS — E01.0 THYROMEGALY: ICD-10-CM

## 2022-09-13 LAB
ANION GAP SERPL CALCULATED.3IONS-SCNC: 12 MMOL/L (ref 7–15)
BASOPHILS # BLD AUTO: 0 10E3/UL (ref 0–0.2)
BASOPHILS NFR BLD AUTO: 1 %
BUN SERPL-MCNC: 16.8 MG/DL (ref 8–23)
CALCIUM SERPL-MCNC: 10.1 MG/DL (ref 8.8–10.2)
CHLORIDE SERPL-SCNC: 106 MMOL/L (ref 98–107)
CHOLEST SERPL-MCNC: 195 MG/DL
CREAT SERPL-MCNC: 1.06 MG/DL (ref 0.51–0.95)
DEPRECATED CALCIDIOL+CALCIFEROL SERPL-MC: 85 UG/L (ref 20–75)
DEPRECATED HCO3 PLAS-SCNC: 22 MMOL/L (ref 22–29)
EOSINOPHIL # BLD AUTO: 0.1 10E3/UL (ref 0–0.7)
EOSINOPHIL NFR BLD AUTO: 2 %
ERYTHROCYTE [DISTWIDTH] IN BLOOD BY AUTOMATED COUNT: 12.8 % (ref 10–15)
GFR SERPL CREATININE-BSD FRML MDRD: 56 ML/MIN/1.73M2
GLUCOSE SERPL-MCNC: 114 MG/DL (ref 70–99)
HBA1C MFR BLD: 5.6 %
HCT VFR BLD AUTO: 41.6 % (ref 35–47)
HDLC SERPL-MCNC: 88 MG/DL
HGB BLD-MCNC: 14.2 G/DL (ref 11.7–15.7)
IMM GRANULOCYTES # BLD: 0 10E3/UL
IMM GRANULOCYTES NFR BLD: 0 %
LDLC SERPL CALC-MCNC: 93 MG/DL
LYMPHOCYTES # BLD AUTO: 1.4 10E3/UL (ref 0.8–5.3)
LYMPHOCYTES NFR BLD AUTO: 29 %
MCH RBC QN AUTO: 30.2 PG (ref 26.5–33)
MCHC RBC AUTO-ENTMCNC: 34.1 G/DL (ref 31.5–36.5)
MCV RBC AUTO: 89 FL (ref 78–100)
MONOCYTES # BLD AUTO: 0.3 10E3/UL (ref 0–1.3)
MONOCYTES NFR BLD AUTO: 7 %
NEUTROPHILS # BLD AUTO: 3.1 10E3/UL (ref 1.6–8.3)
NEUTROPHILS NFR BLD AUTO: 61 %
NONHDLC SERPL-MCNC: 107 MG/DL
NRBC # BLD AUTO: 0 10E3/UL
NRBC BLD AUTO-RTO: 0 /100
PLATELET # BLD AUTO: 256 10E3/UL (ref 150–450)
POTASSIUM SERPL-SCNC: 4.3 MMOL/L (ref 3.4–5.3)
RBC # BLD AUTO: 4.7 10E6/UL (ref 3.8–5.2)
SODIUM SERPL-SCNC: 140 MMOL/L (ref 136–145)
TRIGL SERPL-MCNC: 69 MG/DL
TSH SERPL DL<=0.005 MIU/L-ACNC: 3.37 UIU/ML (ref 0.3–4.2)
WBC # BLD AUTO: 5 10E3/UL (ref 4–11)

## 2022-09-13 PROCEDURE — 80061 LIPID PANEL: CPT | Mod: 90 | Performed by: PATHOLOGY

## 2022-09-13 PROCEDURE — 99000 SPECIMEN HANDLING OFFICE-LAB: CPT | Performed by: PATHOLOGY

## 2022-09-13 PROCEDURE — 85025 COMPLETE CBC W/AUTO DIFF WBC: CPT | Performed by: PATHOLOGY

## 2022-09-13 PROCEDURE — 36415 COLL VENOUS BLD VENIPUNCTURE: CPT | Performed by: PATHOLOGY

## 2022-09-13 PROCEDURE — 80048 BASIC METABOLIC PNL TOTAL CA: CPT | Performed by: PATHOLOGY

## 2022-09-13 PROCEDURE — 84443 ASSAY THYROID STIM HORMONE: CPT | Mod: 90 | Performed by: PATHOLOGY

## 2022-09-13 PROCEDURE — 82306 VITAMIN D 25 HYDROXY: CPT | Mod: 90 | Performed by: PATHOLOGY

## 2022-09-13 PROCEDURE — 99397 PER PM REEVAL EST PAT 65+ YR: CPT | Performed by: INTERNAL MEDICINE

## 2022-09-13 PROCEDURE — 83036 HEMOGLOBIN GLYCOSYLATED A1C: CPT | Mod: 90 | Performed by: PATHOLOGY

## 2022-09-13 RX ORDER — ROSUVASTATIN CALCIUM 20 MG/1
20 TABLET, COATED ORAL DAILY
Qty: 90 TABLET | Refills: 3 | Status: SHIPPED | OUTPATIENT
Start: 2022-09-13 | End: 2022-11-29

## 2022-09-13 NOTE — PATIENT INSTRUCTIONS
To schedule a Mammogram, please call radiology scheduling at [phone].     To schedule your 24 hour BP monitor, please call [phone].

## 2022-09-13 NOTE — NURSING NOTE
Bridget Reeves is a 72 year old female that presents in clinic today for the following:     Chief Complaint   Patient presents with     Physical       The patient's allergies and medications were reviewed. The patient's vitals were obtained without incident. The patient does not have any other questions for the provider.     Garry Shay, EMT at 2:52 PM on 9/13/2022.  Primary Care Clinic: [phone]

## 2022-09-13 NOTE — PROGRESS NOTES
HPI  72-year-old viktoriai for physical examination.  She has been doing well.  She is working out with her  she is tolerating this well.  Although she is put on some weight she has not gained inches and her clothes still fit well by her report.  Otherwise she has been having some occasional back pain with extended car rides she is doing core strengthening and reports that the core is strong.  She has not had any radicular symptoms in association with this.  Otherwise no problems on her current medications although she is requiring Lunesta daily to help with sleep by her report.  She is following with a psychiatrist regarding this.  Past Medical History:   Diagnosis Date     Coronary artery disease 14    MI , had TPA and angioplasty and stent placement     Eczema      Heart attack (H)      Hyperlipidemia      Hypertension      IGT (impaired glucose tolerance)      Osteoarthritis of both knees, unspecified osteoarthritis type 2018     Osteopenia     T score -1.3 in 2017     Pes planus      Psoriasis      Stented coronary artery      Past Surgical History:   Procedure Laterality Date     GYN SURGERY           HEART CATH, ANGIOPLASTY       KERATOTOMY ARCUATE WITH FEMTOSECOND LASER/IMAGING FOR ATIOL Right 2016    Procedure: KERATOTOMY ARCUATE WITH FEMTOSECOND LASER/IMAGING FOR ATIOL;  Surgeon: Dwain Pennington MD;  Location: [facility]     KERATOTOMY ARCUATE WITH FEMTOSECOND LASER/IMAGING FOR ATIOL Left 2016    Procedure: KERATOTOMY ARCUATE WITH FEMTOSECOND LASER/IMAGING FOR ATIOL;  Surgeon: Dwain Pennington MD;  [facility]     PHACOEMULSIFICATION CLEAR CORNEA WITH STANDARD INTRAOCULAR LENS IMPLANT Right 2016    Procedure: PHACOEMULSIFICATION CLEAR CORNEA WITH STANDARD INTRAOCULAR LENS IMPLANT;  Surgeon: wDain Pennington MD;  [facility]     PHACOEMULSIFICATION CLEAR CORNEA WITH STANDARD INTRAOCULAR LENS IMPLANT Left 2016    Procedure: PHACOEMULSIFICATION CLEAR CORNEA  WITH STANDARD INTRAOCULAR LENS IMPLANT;  Surgeon: Dwain Pennington MD;  Location: [facility]     Family History   Problem Relation Age of Onset     Neurologic Disorder Mother         Parkinsonism     C.A.D. Father         CABG     Breast Cancer Paternal Aunt      Diabetes Maternal Grandmother      Breast Cancer Paternal Uncle      Answers for HPI/ROS submitted by the patient on 9/8/2022  General Symptoms: No  Skin Symptoms: Yes  HENT Symptoms: No  EYE SYMPTOMS: No  HEART SYMPTOMS: No  LUNG SYMPTOMS: No  INTESTINAL SYMPTOMS: No  URINARY SYMPTOMS: No  GYNECOLOGIC SYMPTOMS: No  BREAST SYMPTOMS: No  SKELETAL SYMPTOMS: Yes  BLOOD SYMPTOMS: No  NERVOUS SYSTEM SYMPTOMS: No  MENTAL HEALTH SYMPTOMS: No  Changes in hair: No  Changes in moles/birth marks: No  Itching: Yes  Rashes: No  Changes in nails: No  Acne: No  Hair in places you don't want it: No  Change in facial hair: No  Warts: No  Non-healing sores: No  Scarring: No  Flaking of skin: Yes  Color changes of hands/feet in cold : No  Sun sensitivity: No  Skin thickening: No  Back pain: Yes  Muscle aches: Yes  Neck pain: No  Swollen joints: No  Joint pain: No  Bone pain: No  Muscle cramps: No  Muscle weakness: No  Joint stiffness: No  Bone fracture: No        Exam:  BP (!) 156/85 (BP Location: Right arm, Patient Position: Sitting, Cuff Size: Adult Large)   Pulse 87   Wt 89.6 kg (197 lb 9.6 oz)   LMP 12/01/2013   SpO2 95%   BMI 30.04 kg/m    197 lbs 9.6 oz  PHYSICAL EXAMINATION:   The patient is alert, oriented with a clear sensorium.   Skin shows no lesions or rashes and good turgor.   Head is normocephalic and atraumatic.   Eyes show PERRLA. Fundi are benign.   Ears show normal TMs bilaterally.   Mouth shows clear oral mucosa.   Neck shows no nodes, thyromegaly or bruits.   Back is nontender.   Lungs are clear to percussion and auscultation.   Heart shows normal S1 and S2 without murmur or gallop.   Abdomen is soft, nontender without masses or organomegaly.   Extremities  show no edema and no evidence of active synovitis.   Neurologic examination shows cranial nerves II-XII intact. Motor shows normal strength. Reflexes are full and symmetrical.   Labs reviewed:  Results for orders placed or performed in visit on 09/13/22   Lipid Profile     Status: Normal   Result Value Ref Range    Cholesterol 195 <200 mg/dL    Triglycerides 69 <150 mg/dL    Direct Measure HDL 88 >=50 mg/dL    LDL Cholesterol Calculated 93 <=100 mg/dL    Non HDL Cholesterol 107 <130 mg/dL    Narrative    Cholesterol  Desirable:  <200 mg/dL    Triglycerides  Normal:  Less than 150 mg/dL  Borderline High:  150-199 mg/dL  High:  200-499 mg/dL  Very High:  Greater than or equal to 500 mg/dL    Direct Measure HDL  Female:  Greater than or equal to 50 mg/dL   Male:  Greater than or equal to 40 mg/dL    LDL Cholesterol  Desirable:  <100mg/dL  Above Desirable:  100-129 mg/dL   Borderline High:  130-159 mg/dL   High:  160-189 mg/dL   Very High:  >= 190 mg/dL    Non HDL Cholesterol  Desirable:  130 mg/dL  Above Desirable:  130-159 mg/dL  Borderline High:  160-189 mg/dL  High:  190-219 mg/dL  Very High:  Greater than or equal to 220 mg/dL   Basic metabolic panel     Status: Abnormal   Result Value Ref Range    Creatinine 1.06 (H) 0.51 - 0.95 mg/dL    Sodium 140 136 - 145 mmol/L    Potassium 4.3 3.4 - 5.3 mmol/L    Urea Nitrogen 16.8 8.0 - 23.0 mg/dL    Chloride 106 98 - 107 mmol/L    Carbon Dioxide (CO2) 22 22 - 29 mmol/L    Anion Gap 12 7 - 15 mmol/L    Glucose 114 (H) 70 - 99 mg/dL    GFR Estimate 56 (L) >60 mL/min/1.73m2    Calcium 10.1 8.8 - 10.2 mg/dL   Vitamin D Deficiency     Status: Abnormal   Result Value Ref Range    Vitamin D, Total (25-Hydroxy) 85 (H) 20 - 75 ug/L    Narrative    Season, race, dietary intake, and treatment affect the concentration of 25-hydroxy-Vitamin D. Values may decrease during winter months and increase during summer months. Values 20-29 ug/L may indicate Vitamin D insufficiency and values <20  ug/L may indicate Vitamin D deficiency.    Vitamin D determination is routinely performed by an immunoassay specific for 25 hydroxyvitamin D3.  If an individual is on vitamin D2(ergocalciferol) supplementation, please specify 25 OH vitamin D2 and D3 level determination by LCMSMS test VITD23.     Hemoglobin A1c     Status: Normal   Result Value Ref Range    Hemoglobin A1C 5.6 <5.7 %   TSH with free T4 reflex     Status: Normal   Result Value Ref Range    TSH 3.37 0.30 - 4.20 uIU/mL   CBC with platelets and differential     Status: None   Result Value Ref Range    WBC Count 5.0 4.0 - 11.0 10e3/uL    RBC Count 4.70 3.80 - 5.20 10e6/uL    Hemoglobin 14.2 11.7 - 15.7 g/dL    Hematocrit 41.6 35.0 - 47.0 %    MCV 89 78 - 100 fL    MCH 30.2 26.5 - 33.0 pg    MCHC 34.1 31.5 - 36.5 g/dL    RDW 12.8 10.0 - 15.0 %    Platelet Count 256 150 - 450 10e3/uL    % Neutrophils 61 %    % Lymphocytes 29 %    % Monocytes 7 %    % Eosinophils 2 %    % Basophils 1 %    % Immature Granulocytes 0 %    NRBCs per 100 WBC 0 <1 /100    Absolute Neutrophils 3.1 1.6 - 8.3 10e3/uL    Absolute Lymphocytes 1.4 0.8 - 5.3 10e3/uL    Absolute Monocytes 0.3 0.0 - 1.3 10e3/uL    Absolute Eosinophils 0.1 0.0 - 0.7 10e3/uL    Absolute Basophils 0.0 0.0 - 0.2 10e3/uL    Absolute Immature Granulocytes 0.0 <=0.4 10e3/uL    Absolute NRBCs 0.0 10e3/uL   CBC with Platelets & Differential     Status: None    Narrative    The following orders were created for panel order CBC with Platelets & Differential.  Procedure                               Abnormality         Status                     ---------                               -----------         ------                     CBC with platelets and d...[[id number]]                      Final result                 Please view results for these tests on the individual orders.         ASSESSMENT  1 hyperlipidemia on rosuvastatin  2 history of coronary artery disease asymptomatic  3 osteoarthritis of the knees   4  hypertension  5 diffuse thyromegaly  6 IGT    Plan  Will have her get a mammogram do FIT testing for colon cancer screening.  She will have a 24-hour blood pressure monitor done in light of her blood pressure today she does not check it at home at all.  Plan to have her follow-up for reassessment in a year  This note was completed using Dragon voice recognition software.      Matthew Centeno MD  General Internal Medicine  Primary Care Center  [phone]

## 2022-09-14 PROCEDURE — 82274 ASSAY TEST FOR BLOOD FECAL: CPT | Mod: 90 | Performed by: PATHOLOGY

## 2022-09-14 PROCEDURE — 99000 SPECIMEN HANDLING OFFICE-LAB: CPT | Performed by: PATHOLOGY

## 2022-09-16 LAB — HEMOCCULT STL QL IA: NEGATIVE

## 2022-10-11 ENCOUNTER — HOSPITAL ENCOUNTER (OUTPATIENT)
Dept: CARDIOLOGY | Facility: CLINIC | Age: 72
Discharge: HOME | End: 2022-10-11
Attending: INTERNAL MEDICINE | Admitting: INTERNAL MEDICINE
Payer: COMMERCIAL

## 2022-10-11 DIAGNOSIS — R03.0 WHITE COAT SYNDROME WITHOUT HYPERTENSION: ICD-10-CM

## 2022-10-11 PROCEDURE — 93788 AMBL BP MNTR W/SW A/R: CPT

## 2022-10-11 PROCEDURE — 93790 AMBL BP MNTR W/SW I&R: CPT | Performed by: INTERNAL MEDICINE

## 2022-10-11 PROCEDURE — 93786 AMBL BP MNTR W/SW REC ONLY: CPT

## 2022-11-29 ENCOUNTER — TELEPHONE (OUTPATIENT)
Dept: INTERNAL MEDICINE | Facility: CLINIC | Age: 72
End: 2022-11-29

## 2022-11-29 DIAGNOSIS — E78.5 HYPERLIPIDEMIA, UNSPECIFIED HYPERLIPIDEMIA TYPE: ICD-10-CM

## 2022-11-29 RX ORDER — ROSUVASTATIN CALCIUM 20 MG/1
20 TABLET, COATED ORAL DAILY
Qty: 90 TABLET | Refills: 2 | Status: SHIPPED | OUTPATIENT
Start: 2022-11-29 | End: 2023-11-10

## 2022-11-29 ASSESSMENT — ENCOUNTER SYMPTOMS
POOR WOUND HEALING: 0
NAIL CHANGES: 0
SKIN CHANGES: 0

## 2022-11-29 NOTE — TELEPHONE ENCOUNTER
[facility] Call Center    Phone Message    May a detailed message be left on voicemail: yes     Reason for Call: Medication Refill Request    Has the patient contacted the pharmacy for the refill? Yes   Name of medication being requested: rosuvastatin (CRESTOR) 20 MG tablet  Provider who prescribed the medication: pcp  Pharmacy: [facility] - [place], [place] - [address]  Date medication is needed: As soon as possible        Action Taken: Message routed to:  [facility] pcp    Travel Screening: Not Applicable

## 2022-11-29 NOTE — TELEPHONE ENCOUNTER
rosuvastatin (CRESTOR) 20 MG tablet    Remaining refills sent to requested pharmacy.per pharm request, per WO.

## 2022-12-05 ENCOUNTER — OFFICE VISIT (OUTPATIENT)
Dept: INTERNAL MEDICINE | Facility: CLINIC | Age: 72
End: 2022-12-05
Payer: COMMERCIAL

## 2022-12-05 VITALS
HEART RATE: 89 BPM | SYSTOLIC BLOOD PRESSURE: 157 MMHG | DIASTOLIC BLOOD PRESSURE: 83 MMHG | BODY MASS INDEX: 29.92 KG/M2 | WEIGHT: 196.8 LBS | OXYGEN SATURATION: 99 %

## 2022-12-05 DIAGNOSIS — I25.10 CORONARY ARTERY DISEASE INVOLVING NATIVE HEART WITHOUT ANGINA PECTORIS, UNSPECIFIED VESSEL OR LESION TYPE: Primary | ICD-10-CM

## 2022-12-05 PROCEDURE — 99213 OFFICE O/P EST LOW 20 MIN: CPT | Performed by: INTERNAL MEDICINE

## 2022-12-05 NOTE — NURSING NOTE
Bridget Reeves is a 72 year old female patient that presents today in clinic for the following:    Chief Complaint   Patient presents with     Pre-Op Exam     The patient's allergies and medications were reviewed as noted. A set of vitals were recorded as noted without incident: BP (!) 157/83 (BP Location: Right arm, Patient Position: Sitting, Cuff Size: Adult Regular)   Pulse 89   Wt 89.3 kg (196 lb 12.8 oz)   LMP 12/01/2013   SpO2 99%   BMI 29.92 kg/m  . The patient does not have any other questions for the provider.    Monica Li, EMT at 3:46 PM on 12/5/2022

## 2023-03-27 ENCOUNTER — LAB (OUTPATIENT)
Dept: LAB | Facility: CLINIC | Age: 73
End: 2023-03-27
Payer: COMMERCIAL

## 2023-03-27 ENCOUNTER — OFFICE VISIT (OUTPATIENT)
Dept: INTERNAL MEDICINE | Facility: CLINIC | Age: 73
End: 2023-03-27
Payer: COMMERCIAL

## 2023-03-27 VITALS
BODY MASS INDEX: 30.37 KG/M2 | OXYGEN SATURATION: 95 % | DIASTOLIC BLOOD PRESSURE: 91 MMHG | WEIGHT: 200.4 LBS | HEIGHT: 68 IN | SYSTOLIC BLOOD PRESSURE: 160 MMHG | HEART RATE: 84 BPM

## 2023-03-27 DIAGNOSIS — I25.10 CORONARY ARTERY DISEASE INVOLVING NATIVE HEART WITHOUT ANGINA PECTORIS, UNSPECIFIED VESSEL OR LESION TYPE: Primary | ICD-10-CM

## 2023-03-27 DIAGNOSIS — I25.10 CORONARY ARTERY DISEASE INVOLVING NATIVE HEART WITHOUT ANGINA PECTORIS, UNSPECIFIED VESSEL OR LESION TYPE: ICD-10-CM

## 2023-03-27 LAB
ANION GAP SERPL CALCULATED.3IONS-SCNC: 11 MMOL/L (ref 7–15)
BUN SERPL-MCNC: 19.9 MG/DL (ref 8–23)
CALCIUM SERPL-MCNC: 10.3 MG/DL (ref 8.8–10.2)
CHLORIDE SERPL-SCNC: 104 MMOL/L (ref 98–107)
CREAT SERPL-MCNC: 1.05 MG/DL (ref 0.51–0.95)
DEPRECATED HCO3 PLAS-SCNC: 26 MMOL/L (ref 22–29)
GFR SERPL CREATININE-BSD FRML MDRD: 57 ML/MIN/1.73M2
GLUCOSE SERPL-MCNC: 114 MG/DL (ref 70–99)
POTASSIUM SERPL-SCNC: 4.3 MMOL/L (ref 3.4–5.3)
SODIUM SERPL-SCNC: 141 MMOL/L (ref 136–145)

## 2023-03-27 PROCEDURE — 80048 BASIC METABOLIC PNL TOTAL CA: CPT | Performed by: PATHOLOGY

## 2023-03-27 PROCEDURE — 36415 COLL VENOUS BLD VENIPUNCTURE: CPT | Performed by: PATHOLOGY

## 2023-03-27 PROCEDURE — 99214 OFFICE O/P EST MOD 30 MIN: CPT | Performed by: INTERNAL MEDICINE

## 2023-03-27 ASSESSMENT — ACTIVITIES OF DAILY LIVING (ADL)
IN_THE_PAST_7_DAYS,_DID_YOU_NEED_HELP_FROM_OTHERS_TO_PERFORM_EVERYDAY_ACTIVITIES_SUCH_AS_EATING,_GETTING_DRESSED,_GROOMING,_BATHING,_WALKING,_OR_USING_THE_TOILET: N
IN_THE_PAST_7_DAYS,_DID_YOU_NEED_HELP_FROM_OTHERS_TO_TAKE_CARE_OF_THINGS_SUCH_AS_LAUNDRY_AND_HOUSEKEEPING,_BANKING,_SHOPPING,_USING_THE_TELEPHONE,_FOOD_PREPARATION,_TRANSPORTATION,_OR_TAKING_YOUR_OWN_MEDICATIONS?: N

## 2023-03-27 NOTE — PROGRESS NOTES
Surgeon: Dr. Enedelia Pastor, DPM  Fax number for Preop Evaluation: [phone]  Location of Surgery: [facility]  Date of Surgery: 3/30/23  Procedure: Bunion repair soft tissue right foot surgery  History of reaction to anesthesia? Unknown    Ihsan Arvizu, EMT at 5:34 PM on 3/27/2023.    HPI  72-year-old presents today for preoperative medical evaluation prior to planned bunion repair surgery.  She continues to exercise daily combination of strength training cardiovascular straining she is tolerating this well.  She is exercising aggressively there is no chest pain dyspnea dizziness lightheadedness palpitations or other abnormality.  She does monitor her blood pressure at home and is similar to what we observed on her 24-hour blood pressure monitor study generally running in the 130s over 70s to 80s.  She feels that the blood pressure elevation today is in part related to stress as she is selling her housing and planning to move.  Otherwise she has tolerated her recent eye surgery well and had an excellent outcome from that.  She has no history of problems or complications related to anesthesia or surgery.  She had no history of bleeding or clotting.  Only medications are aspirin and rosuvastatin and she will hold off on the aspirin for a week prior to  Past Medical History:   Diagnosis Date     Coronary artery disease 14    MI , had TPA and angioplasty and stent placement     Eczema      Heart attack (H)      Hyperlipidemia      Hypertension      IGT (impaired glucose tolerance)      Osteoarthritis of both knees, unspecified osteoarthritis type 2018     Osteopenia     T score -1.3 in 2017     Pes planus      Psoriasis      Stented coronary artery      Past Surgical History:   Procedure Laterality Date     GYN SURGERY           HEART CATH, ANGIOPLASTY       KERATOTOMY ARCUATE WITH FEMTOSECOND LASER/IMAGING FOR ATIOL Right 2016    Procedure: KERATOTOMY ARCUATE WITH  "FEMTOSECOND LASER/IMAGING FOR ATIOL;  Surgeon: Dwain Pennington MD;  Location: [facility]     KERATOTOMY ARCUATE WITH FEMTOSECOND LASER/IMAGING FOR ATIOL Left 6/2/2016    Procedure: KERATOTOMY ARCUATE WITH FEMTOSECOND LASER/IMAGING FOR ATIOL;  Surgeon: Dwain Pennington MD;  Location: [facility]     PHACOEMULSIFICATION CLEAR CORNEA WITH STANDARD INTRAOCULAR LENS IMPLANT Right 5/5/2016    Procedure: PHACOEMULSIFICATION CLEAR CORNEA WITH STANDARD INTRAOCULAR LENS IMPLANT;  Surgeon: Dwain Pennington MD;  [facility]     PHACOEMULSIFICATION CLEAR CORNEA WITH STANDARD INTRAOCULAR LENS IMPLANT Left 6/2/2016    Procedure: PHACOEMULSIFICATION CLEAR CORNEA WITH STANDARD INTRAOCULAR LENS IMPLANT;  Surgeon: Dwain Pennington MD;  Location: [facility]     Family History   Problem Relation Age of Onset     Neurologic Disorder Mother         Parkinsonism     C.A.D. Father         CABG     Breast Cancer Paternal Aunt      Diabetes Maternal Grandmother      Breast Cancer Paternal Uncle      Answers for HPI/ROS submitted by the patient on 3/27/2023  General Symptoms: No  Skin Symptoms: No  HENT Symptoms: No  EYE SYMPTOMS: No  HEART SYMPTOMS: No  LUNG SYMPTOMS: No  INTESTINAL SYMPTOMS: No  URINARY SYMPTOMS: No  GYNECOLOGIC SYMPTOMS: No  BREAST SYMPTOMS: No  SKELETAL SYMPTOMS: No  BLOOD SYMPTOMS: No  NERVOUS SYSTEM SYMPTOMS: No  MENTAL HEALTH SYMPTOMS: No        Exam:  BP (!) 160/91 (BP Location: Right arm, Patient Position: Sitting, Cuff Size: Adult Large)   Pulse 84   Ht 1.727 m (5' 7.99\")   Wt 90.9 kg (200 lb 6.4 oz)   LMP 12/01/2013   SpO2 95%   BMI 30.48 kg/m    200 lbs 6.4 oz  PHYSICAL EXAMINATION:   The patient is alert, oriented with a clear sensorium.   Skin shows no lesions or rashes and good turgor.   Head is normocephalic and atraumatic.   Eyes show PERRLA.  Ears show normal TMs bilaterally.   Mouth shows clear oral mucosa.   Neck shows no nodes, thyromegaly or bruits.   Back is nontender.   Lungs are clear to percussion and " auscultation.   Heart shows normal S1 and S2 without murmur or gallop.   Abdomen is soft, nontender without masses or organomegaly.   Extremities show no edema and no evidence of active synovitis.   Neurologic examination shows cranial nerves II-XII intact. Motor shows normal strength. Reflexes are full and symmetrical.     Labs reviewed:  Results for orders placed or performed in visit on 03/27/23   Basic metabolic panel     Status: Abnormal   Result Value Ref Range    Sodium 141 136 - 145 mmol/L    Potassium 4.3 3.4 - 5.3 mmol/L    Chloride 104 98 - 107 mmol/L    Carbon Dioxide (CO2) 26 22 - 29 mmol/L    Anion Gap 11 7 - 15 mmol/L    Urea Nitrogen 19.9 8.0 - 23.0 mg/dL    Creatinine 1.05 (H) 0.51 - 0.95 mg/dL    Calcium 10.3 (H) 8.8 - 10.2 mg/dL    Glucose 114 (H) 70 - 99 mg/dL    GFR Estimate 57 (L) >60 mL/min/1.73m2       ASSESSMENT  1 hypertension with borderline 24 hr BP monitor (130/78 average)  2 history of coronary artery disease asymptomatic  3 osteoarthritis of the knees   4 hyperlipidemia on rosuvastatin  5 diffuse thyromegaly euthyroid  6 IGT    Plan  She will continue to monitor the blood pressure at home and we will reassess her BMP today.  Otherwise she does not require any additional imaging or investigation prior to her planned low risk surgery.  She would be low risk for cardiovascular complications.  This note was completed using Dragon voice recognition software.      Matthew Centeno MD  General Internal Medicine  [facility] Center  [phone]

## 2023-03-27 NOTE — NURSING NOTE
"Bridget Reeves is a 72 year old female patient that presents today in clinic for the following:    Chief Complaint   Patient presents with     Pre-Op Exam     Pre-Op for Bunion repair soft tissue Right Foot Surgery @ [facility], w/ Dr. Enedelia Pastor DPM, Fax: [phone]     The patient's allergies and medications were reviewed as noted. A set of vitals were recorded as noted without incident: BP (!) 176/84 (BP Location: Right arm, Patient Position: Sitting, Cuff Size: Adult Regular)   Pulse 87   Ht 1.727 m (5' 7.99\")   Wt 90.9 kg (200 lb 6.4 oz)   LMP 12/01/2013   SpO2 96%   BMI 30.48 kg/m  . The patient does not have any other questions for the provider.    Ihsan Arvizu, EMT 5:46 PM on 3/27/2023   "

## 2023-04-14 ENCOUNTER — MYC MEDICAL ADVICE (OUTPATIENT)
Dept: INTERNAL MEDICINE | Facility: CLINIC | Age: 73
End: 2023-04-14
Payer: COMMERCIAL

## 2023-04-27 ENCOUNTER — OFFICE VISIT (OUTPATIENT)
Dept: INTERNAL MEDICINE | Facility: CLINIC | Age: 73
End: 2023-04-27
Payer: COMMERCIAL

## 2023-04-27 ENCOUNTER — LAB (OUTPATIENT)
Dept: LAB | Facility: CLINIC | Age: 73
End: 2023-04-27
Payer: COMMERCIAL

## 2023-04-27 VITALS
WEIGHT: 200.2 LBS | OXYGEN SATURATION: 96 % | DIASTOLIC BLOOD PRESSURE: 73 MMHG | HEART RATE: 88 BPM | SYSTOLIC BLOOD PRESSURE: 148 MMHG | BODY MASS INDEX: 30.45 KG/M2

## 2023-04-27 DIAGNOSIS — Z01.818 PRE-OP EXAM: Primary | ICD-10-CM

## 2023-04-27 DIAGNOSIS — M20.42 HAMMER TOE OF LEFT FOOT: ICD-10-CM

## 2023-04-27 DIAGNOSIS — Z01.818 PRE-OP EXAM: ICD-10-CM

## 2023-04-27 DIAGNOSIS — M21.612 BUNION, LEFT: ICD-10-CM

## 2023-04-27 LAB
ANION GAP SERPL CALCULATED.3IONS-SCNC: 9 MMOL/L (ref 7–15)
ATRIAL RATE - MUSE: 75 BPM
BUN SERPL-MCNC: 19.4 MG/DL (ref 8–23)
CALCIUM SERPL-MCNC: 10.3 MG/DL (ref 8.8–10.2)
CHLORIDE SERPL-SCNC: 107 MMOL/L (ref 98–107)
CREAT SERPL-MCNC: 0.94 MG/DL (ref 0.51–0.95)
DEPRECATED HCO3 PLAS-SCNC: 25 MMOL/L (ref 22–29)
DIASTOLIC BLOOD PRESSURE - MUSE: NORMAL MMHG
ERYTHROCYTE [DISTWIDTH] IN BLOOD BY AUTOMATED COUNT: 12.4 % (ref 10–15)
GFR SERPL CREATININE-BSD FRML MDRD: 64 ML/MIN/1.73M2
GLUCOSE SERPL-MCNC: 124 MG/DL (ref 70–99)
HCT VFR BLD AUTO: 42.1 % (ref 35–47)
HGB BLD-MCNC: 14.1 G/DL (ref 11.7–15.7)
INTERPRETATION ECG - MUSE: NORMAL
MCH RBC QN AUTO: 29.4 PG (ref 26.5–33)
MCHC RBC AUTO-ENTMCNC: 33.5 G/DL (ref 31.5–36.5)
MCV RBC AUTO: 88 FL (ref 78–100)
P AXIS - MUSE: 50 DEGREES
PLATELET # BLD AUTO: 277 10E3/UL (ref 150–450)
POTASSIUM SERPL-SCNC: 4.5 MMOL/L (ref 3.4–5.3)
PR INTERVAL - MUSE: 168 MS
QRS DURATION - MUSE: 94 MS
QT - MUSE: 408 MS
QTC - MUSE: 455 MS
R AXIS - MUSE: -44 DEGREES
RBC # BLD AUTO: 4.8 10E6/UL (ref 3.8–5.2)
SODIUM SERPL-SCNC: 141 MMOL/L (ref 136–145)
SYSTOLIC BLOOD PRESSURE - MUSE: NORMAL MMHG
T AXIS - MUSE: 50 DEGREES
VENTRICULAR RATE- MUSE: 75 BPM
WBC # BLD AUTO: 6 10E3/UL (ref 4–11)

## 2023-04-27 PROCEDURE — 93000 ELECTROCARDIOGRAM COMPLETE: CPT | Performed by: STUDENT IN AN ORGANIZED HEALTH CARE EDUCATION/TRAINING PROGRAM

## 2023-04-27 PROCEDURE — 80048 BASIC METABOLIC PNL TOTAL CA: CPT | Performed by: PATHOLOGY

## 2023-04-27 PROCEDURE — 36415 COLL VENOUS BLD VENIPUNCTURE: CPT | Performed by: PATHOLOGY

## 2023-04-27 PROCEDURE — 99214 OFFICE O/P EST MOD 30 MIN: CPT | Mod: 25 | Performed by: STUDENT IN AN ORGANIZED HEALTH CARE EDUCATION/TRAINING PROGRAM

## 2023-04-27 PROCEDURE — 85027 COMPLETE CBC AUTOMATED: CPT | Performed by: PATHOLOGY

## 2023-04-27 NOTE — NURSING NOTE
Bridget Reeves is a 73 year old female that presents in clinic today for the following:     Chief Complaint   Patient presents with     Pre-Op Exam     Pt here for a pre-op exam       The patient's allergies and medications were reviewed. The patient's vitals were obtained without incident. The patient does not have any other questions for the provider.     Danette Kaplan, EMT at 9:11 AM on 4/27/2023.  Primary Care Clinic: [phone]

## 2023-04-27 NOTE — PROGRESS NOTES
Surgeon (please enter first and last name): Enedelia Pastor  Fax number for Preop Evaluation:[phone]  Location of Surgery: [address] [place] [address]  Date of Surgery: 4/28  Procedure: Bunion repair left foot, hammer toe repair, 2nd toe left foot  History of reaction to anesthesia? No  Danette Kaplan, EMT at 8:45 AM on 4/27/2023.

## 2023-04-27 NOTE — PROGRESS NOTES
PRE-OP EVALUATION:  [facility]  Internal Medicine     HPI:      Bridget Reeves 73 year old female who presents today at the request of  Enedelia Pastor MD for preoperative cardiopulmonary risk assessment for the following intended procedure: Bunion repair left foot, hammer toe repair, 2nd toe left foot    Type of anesthesia anticipated:  General    last dose of NSAID-- was Naproxen ~ 1 week ago  Last dose of aspirin was 23  alcohol - 1/drink per drinking day ~5 days per week  smoking- never smoker  recreational drug use--none    Cardiac risks: History of MI s/p PCI  Pulmonary risks: None  Exercise tolerance: >= 4 METS  Personal history of bleeding tendencies/disorders: None  Family history of bleeding tendencies/disorders: None  Personal history of adverse anesthesia reactions: None  Family history of adverse anesthesia reactions: None  Personal history of unanticipated surgical complications: None                                                                                                                                                        .     MEDICAL HISTORY:     Patient Active Problem List   Diagnosis     Myocardial infarction (H)     S/P angioplasty with stent     Hyperlipidemia LDL goal <100     Coronary artery disease     Decreased cardiac ejection fraction     Osteoarthritis of both knees, unspecified osteoarthritis type       Past Surgical History:   Procedure Laterality Date     GYN SURGERY           HEART CATH, ANGIOPLASTY       KERATOTOMY ARCUATE WITH FEMTOSECOND LASER/IMAGING FOR ATIOL Right 2016    Procedure: KERATOTOMY ARCUATE WITH FEMTOSECOND LASER/IMAGING FOR ATIOL;  Surgeon: Dwain Pennington MD;  [facility]  [facility]     KERATOTOMY ARCUATE WITH FEMTOSECOND LASER/IMAGING FOR ATIOL Left 2016    Procedure: KERATOTOMY ARCUATE WITH FEMTOSECOND LASER/IMAGING FOR ATIOL;  Surgeon: Dwain Pennington MD;  Location:  [facility]     PHACOEMULSIFICATION CLEAR CORNEA WITH STANDARD  INTRAOCULAR LENS IMPLANT Right 5/5/2016    Procedure: PHACOEMULSIFICATION CLEAR CORNEA WITH STANDARD INTRAOCULAR LENS IMPLANT;  Surgeon: Dwain Pennington MD;  Location: [facility]     PHACOEMULSIFICATION CLEAR CORNEA WITH STANDARD INTRAOCULAR LENS IMPLANT Left 6/2/2016    Procedure: PHACOEMULSIFICATION CLEAR CORNEA WITH STANDARD INTRAOCULAR LENS IMPLANT;  Surgeon: Dwain Pennington MD;  Location: [facility]       Family History   Problem Relation Age of Onset     Neurologic Disorder Mother         Parkinsonism     C.A.D. Father         CABG     Breast Cancer Paternal Aunt      Diabetes Maternal Grandmother      Breast Cancer Paternal Uncle        Social History     Tobacco Use     Smoking status: Never     Smokeless tobacco: Never   Substance Use Topics     Alcohol use: Yes     Alcohol/week: 2.5 standard drinks of alcohol     Types: 3 Standard drinks or equivalent per week     Drug use: No       Current Outpatient Medications   Medication Sig Dispense Refill     aspirin (ASPIRIN) 81 MG EC tablet Take 2 tablets (162 mg) by mouth daily 180 tablet 3     Calcium Carbonate-Vitamin D (CALCIUM 600+D PO) Take by mouth daily       clobetasol (TEMOVATE) 0.05 % external solution        desonide (DESOWEN) 0.05 % cream        Multiple Vitamin (MULTI-DAY VITAMINS PO) Take by mouth daily       NAPROXEN PO Take  by mouth. PRN       Probiotic Product (PROBIOTIC-10 ULTIMATE PO) Take by mouth daily       rosuvastatin (CRESTOR) 20 MG tablet Take 1 tablet (20 mg) by mouth daily 90 tablet 2     eszopiclone (LUNESTA) 2 MG tablet Take 1 tablet by mouth nightly as needed. (Patient not taking: Reported on 4/27/2023)       IBUPROFEN IB OR Take  by mouth. PRN (Patient not taking: Reported on 4/27/2023)       nitroGLYcerin (NITROSTAT) 0.4 MG sublingual tablet Place 1 tablet (0.4 mg) under the tongue every 5 minutes as needed 25 tablet 0       Allergies   Allergen Reactions     Dust Mite Extract      Other reaction(s): Cough     Zolpidem Tartrate       "Restless leg,  Wild dreams     Amoxicillin Rash     Mold Rash     Bloody nose           Latex Allergy: NO      REVIEW OF SYSTEMS:     Pertinent questions are noted below.  Answers are \"NO\" unless otherwise noted:    1.  - Do you have a history of heart attack, stroke, stent, bypass or surgery on an artery in the head, neck, heart or legs?  2.  - Do you ever have any pain or discomfort in your chest?   3. - Do you have a history of  Heart Failure?  4. - Are you troubled by shortness of breath when: walking on the level, up a slight hill or at night?  5. - Do you currently have a cold, bronchitis or other respiratory infection?  6. - Do you have a cough, shortness of breath or wheezing?  7. - Do you sometimes get pains in the calves of your legs when you walk?  8. - Do you or anyone in your family have previous history of blood clots?  9. - Do you or does anyone in your family have a serious bleeding problem such as prolonged bleeding following surgeries or cuts?  10. - Have you ever had problems with anemia or been told to take iron pills?  11. - Have you had any abnormal blood loss such as black, tarry or bloody stools, or abnormal vaginal bleeding?  12. - Have you ever had a blood transfusion? Yes  13. - Have you or any of your relatives ever had problems with anesthesia?  14. - Do you have sleep apnea, excessive snoring or daytime drowsiness?   15. - Do you have any prosthetic heart valves?  16. - Do you have prosthetic joints?  17. - Is there any chance that you may be pregnant? No    EXAM:   BP (!) 148/73 (BP Location: Right arm, Patient Position: Sitting, Cuff Size: Adult Large)   Pulse 88   Wt 90.8 kg (200 lb 3.2 oz)   LMP 12/01/2013   SpO2 96%   BMI 30.45 kg/m      General:  A&Ox3, NAD  Head: atraumatic  HEENT:  Pupils 2-3 mm, sclera white, EOM's full, conjunctiva moist, no periorbital swelling    Kungs:  Clear to auscultation throughout, no wheezes, rhonchi or rales. Normal respiratory effort and no " intercostal retractions.  C/V:  Regular rate and rhythm, no murmurs, rubs or gallops. Radial and DP pulses 2+, equal and regular.  Abdomen:  Not distended.  No tenderness, no hepatosplenomegaly or masses.    Neuro: Alert and oriented, face symmetric. Able to get on/off exam table without assistance.  Strength grossly intact. Gait steady.   Skin:   Normal temperature., turgor and texture. No rashes, suspicious lesions, or jaundice on exposed skin surfaces.   Extremities:  No peripheral edema, no digital cyanosis  Psych:  Alert and oriented. Appropriate affect.  Not psychomotor slowed.  No signs of anxiety or agitation.      DIAGNOSTICS:   The following, pertinent test results were reviewed:    EKG: NSR w/ left axis deviation. No ST changes  Dr. Felipe (staff) reviewed the EKG and agrees with the reading as documented by the resident.      IMPRESSION:     The proposed surgical procedure is considered to be low risk.  Patient has an intermediate (RCRI score of 1, class II)  cardiovascular risk and a low pulmonary risk profile (Ariscat score of 3 points)      The patient has the following additional risks for perioperative complications:   History of MI, elevated BP    Diagnoses for the visit today:  Bridget was seen today for pre-op exam.    Diagnoses and all orders for this visit:    Pre-op exam  -     EKG 12-lead complete w/read - Clinics  -     CBC with platelets; Future  -     Basic metabolic panel  (Ca, Cl, CO2, Creat, Gluc, K, Na, BUN); Future    Bunion, left    Hammer toe of left foot         RECOMMENDATIONS:     Bridget Reeves may proceed with proposed procedure, with the following diagnositic tests and/or specialist consultation(s):  None    Instructions for home medications odalis-operatively discussed with patient verbally and in writing      Routine follow up with the patient's primary care provider is advised    Patient staffed with Dr. Tamir Felipe .    Capo Kirby MD  Internal Medicine,  PGY-3  Pager: [phone]

## 2023-04-27 NOTE — LETTER
4/27/2023       RE: Bridget Reeves  [address]   [facility] [address]     Dear Colleague,    Thank you for referring your patient, Bridget Reeves, to the [facility] INTERNAL MEDICINE [place] [facility]. Please see a copy of my visit note below.    Surgeon (please enter first and last name): Enedelia Pastor  Fax number for Preop Evaluation:[phone]  Location of Surgery: [address] [place] [address]  Date of Surgery: 4/28  Procedure: Bunion repair left foot, hammer toe repair, 2nd toe left foot  History of reaction to anesthesia? No  Danette Kaplan, EMT at 8:45 AM on 4/27/2023.      PRE-OP EVALUATION:  [facility]  Internal Medicine     HPI:      Bridget Reeves 73 year old female who presents today at the request of  Enedelia Pastor MD for preoperative cardiopulmonary risk assessment for the following intended procedure: Bunion repair left foot, hammer toe repair, 2nd toe left foot    Type of anesthesia anticipated:  General    last dose of NSAID-- was Naproxen ~ 1 week ago  Last dose of aspirin was 4/22/23  alcohol - 1/drink per drinking day ~5 days per week  smoking- never smoker  recreational drug use--none    Cardiac risks: History of MI s/p PCI  Pulmonary risks: None  Exercise tolerance: >= 4 METS  Personal history of bleeding tendencies/disorders: None  Family history of bleeding tendencies/disorders: None  Personal history of adverse anesthesia reactions: None  Family history of adverse anesthesia reactions: None  Personal history of unanticipated surgical complications: None                                                                                                                                                        .     MEDICAL HISTORY:     Patient Active Problem List   Diagnosis    Myocardial infarction (H)    S/P angioplasty with stent    Hyperlipidemia LDL goal <100    Coronary artery  disease    Decreased cardiac ejection fraction    Osteoarthritis of both knees, unspecified osteoarthritis type       Past Surgical History:   Procedure Laterality Date    GYN SURGERY          HEART CATH, ANGIOPLASTY      KERATOTOMY ARCUATE WITH FEMTOSECOND LASER/IMAGING FOR ATIOL Right 2016    Procedure: KERATOTOMY ARCUATE WITH FEMTOSECOND LASER/IMAGING FOR ATIOL;  Surgeon: Dwain Pennington MD;  [facility]  [facility]    KERATOTOMY ARCUATE WITH FEMTOSECOND LASER/IMAGING FOR ATIOL Left 2016    Procedure: KERATOTOMY ARCUATE WITH FEMTOSECOND LASER/IMAGING FOR ATIOL;  Surgeon: Dwain Pennington MD;  [facility]:  [facility]    PHACOEMULSIFICATION CLEAR CORNEA WITH STANDARD INTRAOCULAR LENS IMPLANT Right 2016    Procedure: PHACOEMULSIFICATION CLEAR CORNEA WITH STANDARD INTRAOCULAR LENS IMPLANT;  Surgeon: Dwain Pennington MD;  [facility]:  [facility]    PHACOEMULSIFICATION CLEAR CORNEA WITH STANDARD INTRAOCULAR LENS IMPLANT Left 2016    Procedure: PHACOEMULSIFICATION CLEAR CORNEA WITH STANDARD INTRAOCULAR LENS IMPLANT;  Surgeon: Dwain Penningotn MD;  Location: [facility]       Family History   Problem Relation Age of Onset    Neurologic Disorder Mother         Parkinsonism    C.A.D. Father         CABG    Breast Cancer Paternal Aunt     Diabetes Maternal Grandmother     Breast Cancer Paternal Uncle        Social History     Tobacco Use    Smoking status: Never    Smokeless tobacco: Never   Substance Use Topics    Alcohol use: Yes     Alcohol/week: 2.5 standard drinks of alcohol     Types: 3 Standard drinks or equivalent per week    Drug use: No       Current Outpatient Medications   Medication Sig Dispense Refill    aspirin (ASPIRIN) 81 MG EC tablet Take 2 tablets (162 mg) by mouth daily 180 tablet 3    Calcium Carbonate-Vitamin D (CALCIUM 600+D PO) Take by mouth daily      clobetasol (TEMOVATE) 0.05 % external solution       desonide (DESOWEN) 0.05 % cream       Multiple Vitamin (MULTI-DAY VITAMINS PO) Take by mouth daily    "   NAPROXEN PO Take  by mouth. PRN      Probiotic Product (PROBIOTIC-10 ULTIMATE PO) Take by mouth daily      rosuvastatin (CRESTOR) 20 MG tablet Take 1 tablet (20 mg) by mouth daily 90 tablet 2    eszopiclone (LUNESTA) 2 MG tablet Take 1 tablet by mouth nightly as needed. (Patient not taking: Reported on 4/27/2023)      IBUPROFEN IB OR Take  by mouth. PRN (Patient not taking: Reported on 4/27/2023)      nitroGLYcerin (NITROSTAT) 0.4 MG sublingual tablet Place 1 tablet (0.4 mg) under the tongue every 5 minutes as needed 25 tablet 0       Allergies   Allergen Reactions    Dust Mite Extract      Other reaction(s): Cough    Zolpidem Tartrate      Restless leg,  Wild dreams    Amoxicillin Rash    Mold Rash     Bloody nose           Latex Allergy: NO      REVIEW OF SYSTEMS:     Pertinent questions are noted below.  Answers are \"NO\" unless otherwise noted:    1.  - Do you have a history of heart attack, stroke, stent, bypass or surgery on an artery in the head, neck, heart or legs?  2.  - Do you ever have any pain or discomfort in your chest?   3. - Do you have a history of  Heart Failure?  4. - Are you troubled by shortness of breath when: walking on the level, up a slight hill or at night?  5. - Do you currently have a cold, bronchitis or other respiratory infection?  6. - Do you have a cough, shortness of breath or wheezing?  7. - Do you sometimes get pains in the calves of your legs when you walk?  8. - Do you or anyone in your family have previous history of blood clots?  9. - Do you or does anyone in your family have a serious bleeding problem such as prolonged bleeding following surgeries or cuts?  10. - Have you ever had problems with anemia or been told to take iron pills?  11. - Have you had any abnormal blood loss such as black, tarry or bloody stools, or abnormal vaginal bleeding?  12. - Have you ever had a blood transfusion? Yes  13. - Have you or any of your relatives ever had problems with anesthesia?  14. " - Do you have sleep apnea, excessive snoring or daytime drowsiness?   15. - Do you have any prosthetic heart valves?  16. - Do you have prosthetic joints?  17. - Is there any chance that you may be pregnant? No    EXAM:   BP (!) 148/73 (BP Location: Right arm, Patient Position: Sitting, Cuff Size: Adult Large)   Pulse 88   Wt 90.8 kg (200 lb 3.2 oz)   LMP 12/01/2013   SpO2 96%   BMI 30.45 kg/m      General:  A&Ox3, NAD  Head: atraumatic  HEENT:  Pupils 2-3 mm, sclera white, EOM's full, conjunctiva moist, no periorbital swelling    Kungs:  Clear to auscultation throughout, no wheezes, rhonchi or rales. Normal respiratory effort and no intercostal retractions.  C/V:  Regular rate and rhythm, no murmurs, rubs or gallops. Radial and DP pulses 2+, equal and regular.  Abdomen:  Not distended.  No tenderness, no hepatosplenomegaly or masses.    Neuro: Alert and oriented, face symmetric. Able to get on/off exam table without assistance.  Strength grossly intact. Gait steady.   Skin:   Normal temperature., turgor and texture. No rashes, suspicious lesions, or jaundice on exposed skin surfaces.   Extremities:  No peripheral edema, no digital cyanosis  Psych:  Alert and oriented. Appropriate affect.  Not psychomotor slowed.  No signs of anxiety or agitation.      DIAGNOSTICS:   The following, pertinent test results were reviewed:    EKG: NSR w/ left axis deviation. No ST changes  Dr. Felipe (staff) reviewed the EKG and agrees with the reading as documented by the resident.      IMPRESSION:     The proposed surgical procedure is considered to be low risk.  Patient has an intermediate (RCRI score of 1, class II)  cardiovascular risk and a low pulmonary risk profile (Ariscat score of 3 points)      The patient has the following additional risks for perioperative complications:   History of MI, elevated BP    Diagnoses for the visit today:  Bridget was seen today for pre-op exam.    Diagnoses and all orders for this  visit:    Pre-op exam  -     EKG 12-lead complete w/read - Clinics  -     CBC with platelets; Future  -     Basic metabolic panel  (Ca, Cl, CO2, Creat, Gluc, K, Na, BUN); Future    Bunion, left    Hammer toe of left foot         RECOMMENDATIONS:     Bridget Reeves may proceed with proposed procedure, with the following diagnositic tests and/or specialist consultation(s):  None    Instructions for home medications odalis-operatively discussed with patient verbally and in writing      Routine follow up with the patient's primary care provider is advised    Patient staffed with Dr. Tamir Felipe .    Capo Kirby MD  Internal Medicine, PGY-3  Pager: [phone]      Attestation signed by Tamir Felipe MD at 4/27/2023 10:55 AM (Updated):  Physician Attestation  I, Tamir Felipe MD, saw this patient and agree with the findings and plan of care as documented in the note.      Items personally reviewed/procedural attestation: vitals, EKG and agree with the interpretation documented in the note, physical, exam and agree with the interpretation documented in the note. RCRI 1 of 6 with prior CAD with stenting in 2013. EKG with NSR, no q waves or ST changes. QTc 455. Reviewed EKG independently in clinic today. METs likely 4 and above. No chest pains, shortness of breath, edema, bleeding concerns, no hx of clots. Approved for intermediate risk procedure. Obtaining baseline CBC and BMP. Agree with resident plan for patient. Patient has been holding use of aspirin for 5 days, naproxen for 7 days. May continue on all other medications.     Addendum:  Labs show normal CBC: WBC 6.0, Hb 14.1, platelet 277.   Sodium is 141, potassium 4.5, bicarb 25, Cr 0.92, glucose 124, calcium mildly increased to 10.3.    Tamir Felipe MD

## 2023-09-13 ENCOUNTER — LAB (OUTPATIENT)
Dept: LAB | Facility: CLINIC | Age: 73
End: 2023-09-13
Payer: COMMERCIAL

## 2023-09-13 ENCOUNTER — OFFICE VISIT (OUTPATIENT)
Dept: INTERNAL MEDICINE | Facility: CLINIC | Age: 73
End: 2023-09-13
Payer: COMMERCIAL

## 2023-09-13 VITALS
OXYGEN SATURATION: 97 % | SYSTOLIC BLOOD PRESSURE: 157 MMHG | DIASTOLIC BLOOD PRESSURE: 83 MMHG | HEIGHT: 68 IN | HEART RATE: 82 BPM | WEIGHT: 202.6 LBS | BODY MASS INDEX: 30.71 KG/M2

## 2023-09-13 DIAGNOSIS — E01.0 THYROMEGALY: ICD-10-CM

## 2023-09-13 DIAGNOSIS — E83.52 HYPERCALCEMIA: ICD-10-CM

## 2023-09-13 DIAGNOSIS — R73.02 IGT (IMPAIRED GLUCOSE TOLERANCE): ICD-10-CM

## 2023-09-13 DIAGNOSIS — Z12.11 SPECIAL SCREENING FOR MALIGNANT NEOPLASMS, COLON: ICD-10-CM

## 2023-09-13 DIAGNOSIS — E78.5 HYPERLIPIDEMIA LDL GOAL <100: ICD-10-CM

## 2023-09-13 DIAGNOSIS — E83.52 HYPERCALCEMIA: Primary | ICD-10-CM

## 2023-09-13 LAB
ANION GAP SERPL CALCULATED.3IONS-SCNC: 10 MMOL/L (ref 7–15)
BUN SERPL-MCNC: 18.2 MG/DL (ref 8–23)
CALCIUM SERPL-MCNC: 10 MG/DL (ref 8.8–10.2)
CHLORIDE SERPL-SCNC: 107 MMOL/L (ref 98–107)
CHOLEST SERPL-MCNC: 173 MG/DL
CREAT SERPL-MCNC: 0.91 MG/DL (ref 0.51–0.95)
DEPRECATED HCO3 PLAS-SCNC: 24 MMOL/L (ref 22–29)
EGFRCR SERPLBLD CKD-EPI 2021: 67 ML/MIN/1.73M2
GLUCOSE SERPL-MCNC: 120 MG/DL (ref 70–99)
HBA1C MFR BLD: 5.9 %
HDLC SERPL-MCNC: 82 MG/DL
LDLC SERPL CALC-MCNC: 78 MG/DL
NONHDLC SERPL-MCNC: 91 MG/DL
POTASSIUM SERPL-SCNC: 4.5 MMOL/L (ref 3.4–5.3)
PTH-INTACT SERPL-MCNC: 31 PG/ML (ref 15–65)
SODIUM SERPL-SCNC: 141 MMOL/L (ref 136–145)
TRIGL SERPL-MCNC: 64 MG/DL
TSH SERPL DL<=0.005 MIU/L-ACNC: 2.43 UIU/ML (ref 0.3–4.2)

## 2023-09-13 PROCEDURE — 83970 ASSAY OF PARATHORMONE: CPT | Performed by: PATHOLOGY

## 2023-09-13 PROCEDURE — 80061 LIPID PANEL: CPT | Performed by: PATHOLOGY

## 2023-09-13 PROCEDURE — 84443 ASSAY THYROID STIM HORMONE: CPT | Performed by: PATHOLOGY

## 2023-09-13 PROCEDURE — G0439 PPPS, SUBSEQ VISIT: HCPCS | Performed by: INTERNAL MEDICINE

## 2023-09-13 PROCEDURE — 36415 COLL VENOUS BLD VENIPUNCTURE: CPT | Performed by: PATHOLOGY

## 2023-09-13 PROCEDURE — 99000 SPECIMEN HANDLING OFFICE-LAB: CPT | Performed by: PATHOLOGY

## 2023-09-13 PROCEDURE — 82274 ASSAY TEST FOR BLOOD FECAL: CPT | Performed by: INTERNAL MEDICINE

## 2023-09-13 PROCEDURE — 80048 BASIC METABOLIC PNL TOTAL CA: CPT | Performed by: PATHOLOGY

## 2023-09-13 PROCEDURE — 83036 HEMOGLOBIN GLYCOSYLATED A1C: CPT | Performed by: INTERNAL MEDICINE

## 2023-09-13 NOTE — NURSING NOTE
"Bridget Reeves is a 73 year old female patient that presents today in clinic for the following:    Chief Complaint   Patient presents with    Medicare Visit    Refill Request     The patient's allergies and medications were reviewed as noted. A set of vitals were recorded as noted without incident: BP (!) 157/83 (BP Location: Right arm, Patient Position: Sitting, Cuff Size: Adult Regular)   Pulse 82   Ht 1.72 m (5' 7.72\")   Wt 91.9 kg (202 lb 9.6 oz)   LMP 12/01/2013   SpO2 97%   BMI 31.06 kg/m  . The patient does not have any other questions for the provider.    Kelly Pedraza, EMT at 7:50 AM on 9/13/2023  "

## 2023-09-13 NOTE — PROGRESS NOTES
HPI  73-year-old presents today for Medicare wellness visit.  She has not been monitoring her blood pressure at home but has had significant experiences in the past with hypotension.  She has had significant dizziness and near syncope with blood pressures down in the 70s systolic related to her past cardiac medications.  She is also taking supplemental salt tablets as she reportedly sweats and loses a lot of salt with her workouts.  She is working out with a  she is doing a lot of strength training and has been excellent progress in this regard.  Otherwise she is under a lot of stress as they are in the process of selling her home in [place] and moving to [place] currently maintaining 2 residences.  Her sleeping is good and she is adjusted her circadian rhythm based on her Fitbit.  Her diet is healthy.  Past Medical History:   Diagnosis Date    Coronary artery disease 13    MI , had TPA and angioplasty and stent placement    Eczema     Heart attack (H)     Hyperlipidemia     Hypertension     IGT (impaired glucose tolerance)     Osteoarthritis of both knees, unspecified osteoarthritis type 2018    Osteopenia     T score -1.3 in 2017    Pes planus     Psoriasis     Stented coronary artery      Past Surgical History:   Procedure Laterality Date    GYN SURGERY          HEART CATH, ANGIOPLASTY      KERATOTOMY ARCUATE WITH FEMTOSECOND LASER/IMAGING FOR ATIOL Right 2016    Procedure: KERATOTOMY ARCUATE WITH FEMTOSECOND LASER/IMAGING FOR ATIOL;  Surgeon: Dwain Pennington MD;  [facility]  [facility]    KERATOTOMY ARCUATE WITH FEMTOSECOND LASER/IMAGING FOR ATIOL Left 2016    Procedure: KERATOTOMY ARCUATE WITH FEMTOSECOND LASER/IMAGING FOR ATIOL;  Surgeon: Dwain Pennington MD;  [facility]:  [facility]    PHACOEMULSIFICATION CLEAR CORNEA WITH STANDARD INTRAOCULAR LENS IMPLANT Right 2016    Procedure: PHACOEMULSIFICATION CLEAR CORNEA WITH STANDARD INTRAOCULAR LENS IMPLANT;  Surgeon: Dwain Pennington  "MD SOPHIE;  Location: [facility]    PHACOEMULSIFICATION CLEAR CORNEA WITH STANDARD INTRAOCULAR LENS IMPLANT Left 6/2/2016    Procedure: PHACOEMULSIFICATION CLEAR CORNEA WITH STANDARD INTRAOCULAR LENS IMPLANT;  Surgeon: Dwain Pennington MD;  Location: [facility]     Family History   Problem Relation Age of Onset    Neurologic Disorder Mother         Parkinsonism    C.A.D. Father         CABG    Breast Cancer Paternal Aunt     Diabetes Maternal Grandmother     Breast Cancer Paternal Uncle          Exam:  BP (!) 157/83 (BP Location: Right arm, Patient Position: Sitting, Cuff Size: Adult Regular)   Pulse 82   Ht 1.72 m (5' 7.72\")   Wt 91.9 kg (202 lb 9.6 oz)   LMP 12/01/2013   SpO2 97%   BMI 31.06 kg/m    202 lbs 9.6 oz  PHYSICAL EXAMINATION:   The patient is alert, oriented with a clear sensorium.   Skin shows no lesions or rashes and good turgor.   Head is normocephalic and atraumatic.   Eyes show PERRLA.  Ears show normal TMs bilaterally.   Mouth shows clear oral mucosa.   Neck shows no nodes, mild diffuse thyromegaly no bruits.   Back is nontender.   Lungs are clear to percussion and auscultation.   Heart shows normal S1 and S2 without murmur or gallop.   Abdomen is obese, soft, nontender without masses or organomegaly.   Extremities show no edema and no evidence of active synovitis.   Neurologic examination shows cranial nerves II-XII intact. Motor shows normal strength. Reflexes are full and symmetrical.     Labs reviewed:  Results for orders placed or performed in visit on 09/13/23   Basic metabolic panel     Status: Abnormal   Result Value Ref Range    Sodium 141 136 - 145 mmol/L    Potassium 4.5 3.4 - 5.3 mmol/L    Chloride 107 98 - 107 mmol/L    Carbon Dioxide (CO2) 24 22 - 29 mmol/L    Anion Gap 10 7 - 15 mmol/L    Urea Nitrogen 18.2 8.0 - 23.0 mg/dL    Creatinine 0.91 0.51 - 0.95 mg/dL    Calcium 10.0 8.8 - 10.2 mg/dL    Glucose 120 (H) 70 - 99 mg/dL    GFR Estimate 67 >60 mL/min/1.73m2   Hemoglobin A1c     Status: " Abnormal   Result Value Ref Range    Hemoglobin A1C 5.9 (H) <5.7 %   Parathyroid Hormone Intact     Status: Normal   Result Value Ref Range    Parathyroid Hormone Intact 31 15 - 65 pg/mL    Narrative    This result was obtained with the Roche Elecsys PTH STAT assay.   This reference range differs from PTH assays used in other [facility] laboratories.   Lipid Profile     Status: Normal   Result Value Ref Range    Cholesterol 173 <200 mg/dL    Triglycerides 64 <150 mg/dL    Direct Measure HDL 82 >=50 mg/dL    LDL Cholesterol Calculated 78 <=100 mg/dL    Non HDL Cholesterol 91 <130 mg/dL    Narrative    Cholesterol  Desirable:  <200 mg/dL    Triglycerides  Normal:  Less than 150 mg/dL  Borderline High:  150-199 mg/dL  High:  200-499 mg/dL  Very High:  Greater than or equal to 500 mg/dL    Direct Measure HDL  Female:  Greater than or equal to 50 mg/dL   Male:  Greater than or equal to 40 mg/dL    LDL Cholesterol  Desirable:  <100mg/dL  Above Desirable:  100-129 mg/dL   Borderline High:  130-159 mg/dL   High:  160-189 mg/dL   Very High:  >= 190 mg/dL    Non HDL Cholesterol  Desirable:  130 mg/dL  Above Desirable:  130-159 mg/dL  Borderline High:  160-189 mg/dL  High:  190-219 mg/dL  Very High:  Greater than or equal to 220 mg/dL   TSH with free T4 reflex     Status: Normal   Result Value Ref Range    TSH 2.43 0.30 - 4.20 uIU/mL       ASSESSMENT  1 hypertension with borderline 24 hr BP monitor (130/78 average)  2 history of coronary artery disease asymptomatic  3 osteoarthritis of the knees   4 hyperlipidemia on rosuvastatin  5 diffuse thyromegaly euthyroid  6 IGT  7 Borderline hypercalcemia resolved    Plan  We will do FIT testing for colon cancer screening.  We will follow-up on her calcium and check her 24-hour urine calcium as well.  Asked her to monitor blood pressure at home watch her salt and sodium intake.    This note was completed using Dragon voice recognition software.      Matthew Centeno,  MD  General Internal Medicine  [facility]  [phone]

## 2023-09-13 NOTE — PROGRESS NOTES
Medicare Annual Wellness Questionnaire:  This 73 year old year old female presents for a Medicare Wellness Exam.    Fall Risk Assessment:  Have you fallen 2 or more times in the last year?  No    How many times were you injured due to a fall in the last year?  none    PHQ-2:  Over the last 2 weeks, how often have you been bothered by feeling down, depressed, or hopeless?  Not at all (0)     Over the last 2 weeks, how often have you had little interest or pleasure in doing things?  Not at all (0)     Social History:  What is your marital status?      Who lives in your household?  My  and me    Does your home have loose rugs in the hallway:     Yes     Does your home have grab bars in the bathroom:    Yes     Does your home have handrails on the stairs?  Yes     Does your home have poorly lit areas?    No    Do you feel threatened or controlled by a partner, ex-partner or anyone in your life?   No    Has anyone hurt you physically, for example by pushing, hitting, slapping or kicking you or forcing you to have sex?   No    Do you need help with the phone, transportation, shopping, preparing meals, housework, laundry, medications or managing money?   No    Sexual Health:  Are you sexually active?    No    If yes, with men, women, or both?   Men     If yes, how many partners?  1    If yes, are you using condoms?    No    Have you had any sexually transmitted infections in the last year?   No    Do you have any sexual concerns?    No    Women Only:  Women: What year did you stop having periods (approximate age)?  2013    Women: Any vaginal bleeding in the last year?    No    Women: Have you ever had an abnormal Pap smear?    No    General Health Assessment:  Have you noticed any hearing difficulties?   Yes     Do you wear hearing aids?   No    Have you seen a hearing professional such as an audiologist in the last 1 year?   No    Do you have vision difficulty?    Yes     Do you wear glasses or  contacts?   Yes     Have you seen an eye doctor in the last 1 year?   Yes     How many servings of fruits and vegetables do you eat a day?  Fruit: 4  Vegetables: 2    How often do you exercise in a week?  5    How long and what kind of exercise do you do?  Wegiht training 3x /week and 2 days other    Tobacco and Alcohol History:  Do you use tobacco/nicotine products?    No    If yes, please list the method of use and average weekly consumption?  N/A    Do you use any other drugs?   No         Do you drink alcohol?   Yes     If you drink alcohol, how many drinks per week?  3    Advanced Directive:  Have you completed an Advance Directives document?  Yes     If yes, have you given a copy to the clinic?   Yes     Do you need information on Advance Directives?   No    Kelly Pedraza, EMT at 8:26 AM on 9/13/2023

## 2023-09-15 LAB — HEMOCCULT STL QL IA: NEGATIVE

## 2023-09-21 NOTE — PROGRESS NOTES
Op Note        Patient:  Irene Arzate    Preop Dx:  Chronic migraine headaches.    Postop Dx:  Chronic migraine headaches.    Procedure:  Chemodenervation of scalp and neck for chronic migraine (155 units Botox as per protocol approved by FDA).    Surgeon:  Tyler Baig MD.    Assistants:  None.    Description of Procedure:  Chemodenervation of scalp and neck for chronic migraine (155 units Botox as per protocol approved by FDA).    ?The procedure was discussed with the patient, including risks and benefits, and written consent was obtained. ?The patient was placed in the sitting position.  Bilaterally, 31 points along  (2 sites), procerus (1 site), frontalis (4 sites), temporalis (8 sites), occipitalis (6 sites), cervical paraspinals (4 sites), and trapezius (6 sites) were identified and prepped with alcohol.  Using sterile technique, a 30 gauge 0.5 inch needle (for all sites other than trapezius) and 25 gauge 1-1/2 inch (for trapezius only) needle was introduced into each muscle and 5 units Botox per site was injected.  A total of 155 units Botox were used.  Aspirations were negative for blood, CSF and air prior to injection at all sites.  The needle was removed, skin cleansed and a sterile bandage was applied where needed.  The patient tolerated the procedure well and no complications were encountered. The patient was discharged home in good condition with post-procedural instructions.?????????????      Estimated Blood Loss:  Minimal.    Complications:  None.    Dictating Provider,   Tyler Baig MD  Interventional Pain Medicine  [facility], [place]    On 9/21/2023, IReuben, scribed the services personally performed by Tyler Baig MD.    ***         Surgeon (please enter first and last name): Dr. Chris Claire   Fax number for Preop Evaluation: [phone]  Location of Surgery: [facility]  Date of Surgery: 22  Procedure: Bilateral Upper Lid Ptosis and mechanical Ptosis repair  History of reaction to anesthesia? unknown    Monica Li, EMT at 3:37 PM on 2022     HPI  72-year-old presents today for preoperative medical evaluation prior to ptosis surgery.  She has had progressive loss of visual field and vision related to the ptosis and is scheduled for repair.  She continues to exercise regularly and workout regularly with her .  She is doing aggressive cardiovascular and strength training exercise and tolerating this well without chest pain dyspnea dizziness or other complaints.  She had no problems on flights of stairs.  She has had prior surgery no issues regarding anesthesia bleeding or clotting.  Otherwise she has been feeling well and has no other symptoms  Past Medical History:   Diagnosis Date     Coronary artery disease 14    MI , had TPA and angioplasty and stent placement     Eczema      Heart attack (H)      Hyperlipidemia      Hypertension      IGT (impaired glucose tolerance)      Osteoarthritis of both knees, unspecified osteoarthritis type 2018     Osteopenia     T score -1.3 in 2017     Pes planus      Psoriasis      Stented coronary artery      Past Surgical History:   Procedure Laterality Date     GYN SURGERY           HEART CATH, ANGIOPLASTY       KERATOTOMY ARCUATE WITH FEMTOSECOND LASER/IMAGING FOR ATIOL Right 2016    Procedure: KERATOTOMY ARCUATE WITH FEMTOSECOND LASER/IMAGING FOR ATIOL;  Surgeon: Dwain Pennington MD;  Location: [facility]     KERATOTOMY ARCUATE WITH FEMTOSECOND LASER/IMAGING FOR ATIOL Left 2016    Procedure: KERATOTOMY ARCUATE WITH FEMTOSECOND LASER/IMAGING FOR ATIOL;  Surgeon: Dwain Pennington MD;  Location: [facility]     PHACOEMULSIFICATION CLEAR  CORNEA WITH STANDARD INTRAOCULAR LENS IMPLANT Right 5/5/2016    Procedure: PHACOEMULSIFICATION CLEAR CORNEA WITH STANDARD INTRAOCULAR LENS IMPLANT;  Surgeon: Dwain Pennington MD;  Location: [facility]     PHACOEMULSIFICATION CLEAR CORNEA WITH STANDARD INTRAOCULAR LENS IMPLANT Left 6/2/2016    Procedure: PHACOEMULSIFICATION CLEAR CORNEA WITH STANDARD INTRAOCULAR LENS IMPLANT;  Surgeon: Dwain Pennington MD;  Location: [facility]     Family History   Problem Relation Age of Onset     Neurologic Disorder Mother         Parkinsonism     C.A.D. Father         CABG     Breast Cancer Paternal Aunt      Diabetes Maternal Grandmother      Breast Cancer Paternal Uncle          Exam:  LMP 12/01/2013   0 lbs 0 oz  PHYSICAL EXAMINATION:   The patient is alert, oriented with a clear sensorium.   Skin shows no lesions or rashes and good turgor.   Head is normocephalic and atraumatic.   Eyes show PERRLA.   Ears show normal TMs bilaterally.   Mouth shows clear oral mucosa.   Neck shows no nodes, thyromegaly or bruits.   Back is nontender.   Lungs are clear to percussion and auscultation.   Heart shows normal S1 and S2 without murmur or gallop.   Abdomen is soft, nontender without masses or organomegaly.   Extremities show no edema and no evidence of active synovitis.   Neurologic examination shows cranial nerves II-XII intact. Motor shows normal strength. Reflexes are full and symmetrical.       ASSESSMENT  1 hyperlipidemia on rosuvastatin  2 history of coronary artery disease asymptomatic  3 osteoarthritis of the knees   4 hypertension  5 diffuse thyromegaly euthyroid  6 IGT    Plan  She is stable from both a cardiovascular and medical standpoint and would not require any additional imaging or investigation prior to her planned low risk surgery.  She would be low risk for cardiovascular complications.  She will stop her aspirin a week prior to the procedure.  She will continue her other medications through the morning of procedure.  This note  was completed using Dragon voice recognition software.      Matthew Centeno MD  General Internal Medicine  [facility]  [phone]             denies

## 2023-11-08 DIAGNOSIS — E78.5 HYPERLIPIDEMIA, UNSPECIFIED HYPERLIPIDEMIA TYPE: ICD-10-CM

## 2023-11-10 RX ORDER — ROSUVASTATIN CALCIUM 20 MG/1
20 TABLET, COATED ORAL DAILY
Qty: 90 TABLET | Refills: 2 | Status: SHIPPED | OUTPATIENT
Start: 2023-11-10

## 2023-11-10 NOTE — TELEPHONE ENCOUNTER
rosuvastatin (CRESTOR) 20 MG tablet 90 tablet 2 11/29/2022  Last Office Visit : 9/13/2023   Future Office visit:  0    LDL Cholesterol Calculated   Date Value Ref Range Status   09/13/2023 78 <=100 mg/dL Final   06/30/2021 87 <100 mg/dL Final     Comment:     Desirable:       <100 mg/dl

## 2024-10-03 NOTE — LETTER
6/19/2017      RE: Bridget Reeves  [address]     [place] [address]       Dear Colleague,    Thank you for the opportunity to participate in the care of your patient, Bridget Reeves, at the [facility]. Please see a copy of my visit note below.    HPI: HISTORY OF PRESENT ILLNESS:   The patient is a 67-year-old woman with a history of myocardial infarction in [facility] on 05/25/2013; at that time, the patient developed severe crushing substernal chest pain while eating.  She was taken to the nearest hospital where an acute anterior ST elevation myocardial infarction was diagnosed and she received lytic therapy.  The patient was subsequently transported to [place] [facility] [place] where she underwent coronary angiography; this study demonstrated 95 and 75% occlusions of the LAD which underwent stenting.  She was also said to have a 40% mid RCA.  Patient had an echo after the PCI that showed a large area of septal, anterior, anterolateral and apical akinesis with ejection fraction of 30%. She subsequently has done very well without any recurrence of chest pain.  She had been very active throughout her life and currently swims for 20-30 minutes 5 days each week.  She is able to do this without difficulty.  She specifically denied chest discomfort, dyspnea, orthopnea, paroxysmal nocturnal dyspnea, palpitations, presyncope, syncope, edema or claudication.  Patient does have a strong family history on her mother's side of coronary disease; grandfather had his first heart attack at age 52 with subsequent surgery and stent and PCI.  Two of the father's brother also had coronary artery disease at a relatively early age.     6/19/17: Patient reports that since her last visit, she has stopped taking anti-HTN medications due to orthostatic symptoms, which have improved since stopping metoprolol/lisinopril. She reports 3 episodes of angina in the past 12  "months which have occurred 30 min after peak exercise. She exercises frequently, both cardiovascular (swimming) and weight training. In the past 10 months, she is on an intensive 3x weekly weight training program. Each episode of angina has been associated with \"orthostatic\" sensation and usually resolves in 20 minutes. Otherwise, she doesn't have regular, reproducible exertional angina. The quality of the pain is similar to her MI.       PAST MEDICAL HISTORY:  Past Medical History:   Diagnosis Date     Coronary artery disease 5/25/13    MI , had TPA and angioplasty and stent placement     Eczema      Heart attack (H)      Hyperlipidemia      Hypertension      IGT (impaired glucose tolerance)      Osteopenia     T score -1.3 in April 2017     Pes planus      Psoriasis      Stented coronary artery        CURRENT MEDICATIONS:  Current Outpatient Prescriptions   Medication Sig Dispense Refill     clobetasol (TEMOVATE) 0.05 % external solution        desonide (DESOWEN) 0.05 % cream        tacrolimus (PROTOPIC) 0.1 % ointment        atorvastatin (LIPITOR) 80 MG tablet Take 1 tablet (80 mg) by mouth daily 100 tablet 3     nitroglycerin (NITROSTAT) 0.4 MG sublingual tablet Place 1 tablet (0.4 mg) under the tongue every 5 minutes as needed 25 tablet 3     azelastine (ASTELIN) 0.1 % nasal spray        aspirin (ASPIRIN) 81 MG EC tablet Take 2 tablets (162 mg) by mouth daily 180 tablet 3     BUPROPION HCL ER, XL, PO Take 450 mg by mouth daily       estradiol (VAGIFEM) 10 MCG TABS Place 10 mcg vaginally Place vaginally 3 times a week.       Coenzyme Q10 (COQ10) 100 MG CAPS Take 1 capsule by mouth daily       IBUPROFEN IB OR Take  by mouth. PRN       NAPROXEN PO Take  by mouth. PRN       eszopiclone (LUNESTA) 2 MG tablet Take 1 tablet by mouth nightly as needed.       albuterol (PROAIR HFA, PROVENTIL HFA, VENTOLIN HFA) 108 (90 BASE) MCG/ACT inhaler Inhale 2 puffs into the lungs 4 times daily As needed for exercise in the cold " (Patient not taking: Reported on 2017) 1 Inhaler 2       PAST SURGICAL HISTORY:  Past Surgical History:   Procedure Laterality Date     GYN SURGERY           HEART CATH, ANGIOPLASTY       KERATOTOMY ARCUATE WITH FEMTOSECOND LASER/IMAGING FOR ATIOL Right 2016    Procedure: KERATOTOMY ARCUATE WITH FEMTOSECOND LASER/IMAGING FOR ATIOL;  Surgeon: Dwain Pennington MD;  [facility]     KERATOTOMY ARCUATE WITH FEMTOSECOND LASER/IMAGING FOR ATIOL Left 2016    Procedure: KERATOTOMY ARCUATE WITH FEMTOSECOND LASER/IMAGING FOR ATIOL;  Surgeon: Dwain Pennington MD;  [facility]     PHACOEMULSIFICATION CLEAR CORNEA WITH STANDARD INTRAOCULAR LENS IMPLANT Right 2016    Procedure: PHACOEMULSIFICATION CLEAR CORNEA WITH STANDARD INTRAOCULAR LENS IMPLANT;  Surgeon: Dwain Pennington MD;  [facility]     PHACOEMULSIFICATION CLEAR CORNEA WITH STANDARD INTRAOCULAR LENS IMPLANT Left 2016    Procedure: PHACOEMULSIFICATION CLEAR CORNEA WITH STANDARD INTRAOCULAR LENS IMPLANT;  Surgeon: Dwain Pennington MD;  [facility]       ALLERGIES     Allergies   Allergen Reactions     Ambien      Restless leg,  Wild dreams     Amoxicillin Rash     Mold Rash     Bloody nose           FAMILY HISTORY:  Family History   Problem Relation Age of Onset     Neurologic Disorder Mother      Parkinsonism     C.A.D. Father      CABG     Breast Cancer Paternal Aunt      DIABETES Maternal Grandmother      Breast Cancer Paternal Uncle        SOCIAL HISTORY:  History     Social History     Marital Status:      Spouse Name: N/A     Number of Children: N/A     Years of Education: N/A     Social History Main Topics     Smoking status: Never Smoker      Smokeless tobacco: Never Used     Alcohol Use: 1.5 oz/week     3 drink(s) per week     Drug Use: No     Sexually Active: Yes -- Male partner(s)     Other Topics Concern     Special Diet Yes     low carb, no wheat     Exercise Yes     swims 6x/wk     Social History Narrative  "      ROS:   Constitutional: No fever, chills, or sweats. No weight gain/loss   ENT: No visual disturbance, ear ache, epistaxis  Respiratory: No cough, hemoptysis  Cardiovascular: As per HPI  GI: No nausea, vomiting, hematemesis, or hematochezia  : No urinary frequency, dysuria, or hematuria  Integument: Negative  Psychiatric: some depression in the past; treated and not bothersome at present  Neuro: Negative  Endocrinology: Negative   Musculoskeletal: Negative    EXAM:  /84 (BP Location: Right arm, Patient Position: Chair, Cuff Size: Adult Regular)  Pulse 84  Ht 1.727 m (5' 8\")  Wt 86.1 kg (189 lb 12.8 oz)  LMP 12/01/2013  SpO2 99%  BMI 28.86 kg/m2  General: NAD, AAx3  HEENT: Externally normal, sclera clear  CV: regular rhythm, s1, s2, no murmurs or rubs  Lungs: CTAB, non-labored breathing, no wheezing, no crackles  Abd: soft, non-distended, non-tender  Ext: no edema  Skin: no rashes or concerning lesions noted  Neuro: grossly non-focal  Psych: mood/affect appropriate      Labs:  LIPID RESULTS:  Lab Results   Component Value Date    CHOL 199 04/11/2017    HDL 86 04/11/2017     (H) 04/11/2017    TRIG 42 04/11/2017    CHOLHDLRATIO 2.2 04/07/2015       LIVER ENZYME RESULTS:  Lab Results   Component Value Date    AST 26 04/11/2017    ALT 42 04/11/2017       CBC RESULTS:  Lab Results   Component Value Date    WBC 5.5 04/11/2017       BMP RESULTS:  Lab Results   Component Value Date     04/11/2017    POTASSIUM 4.3 04/11/2017    CHLORIDE 108 04/11/2017    CO2 26 04/11/2017    ANIONGAP 6 04/11/2017     (H) 04/11/2017    BUN 19 04/11/2017    CR 0.89 04/11/2017    GFRESTIMATED 64 04/11/2017    GFRESTBLACK 77 04/11/2017    HALLEY 9.0 04/11/2017        A1C RESULTS:  No results found for: A1C    INR RESULTS:  No results found for: INR    Procedures:      Assessment and Plan:     67F hx of coronary artery disease, myocardial infarction, percutaneous coronary intervention to LAD with residual RCA " stenosis presenting with atypical anginal chest pain. Suspicion for coronary etiology can not be excluded even though the lack of reproducibility of her symptoms makes this less likely. Nonetheless, obtaining an exercise stress echo will give us valuable prognostic information and help provide a greater degree of certainty with respect to her management.    1. CAD: Exercise stress echocardiogram  2. Continue current medical regimen, no changes today  3. Follow-up with Dr. Cerna in 12 months, sooner if stress test is abnormal.    Physician Attestation   I, Jake Herrera, saw this patient with the cardiology fellow and agree with the fellow's findings and plan of care as documented in the fellow's note.      I personally reviewed vital signs, medications and labs.    Key findings: 66 yo woman with CAD gives a history of 3 episodes of substernal chest discomfort similar to her prior cardiac pain which may represent angina. We will get an exercise echo to assess her exercise tolerance and blood pressure response to exercise as well as to look for ischemic cause for her symptom.    Jake Herrera  Date of Service (when I saw the patient): 06/19/17        Patient Care Team:  Matthew Centeno MD as PCP - General (Internal Medicine)  SELF, REFERRED       (4) no limitation

## 2024-10-07 DIAGNOSIS — E78.5 HYPERLIPIDEMIA, UNSPECIFIED HYPERLIPIDEMIA TYPE: ICD-10-CM

## 2024-10-14 RX ORDER — ROSUVASTATIN CALCIUM 20 MG/1
20 TABLET, COATED ORAL DAILY
Qty: 90 TABLET | Refills: 0 | Status: SHIPPED | OUTPATIENT
Start: 2024-10-14

## 2024-10-14 NOTE — TELEPHONE ENCOUNTER
Medication Requested:  rosuvastatin (CRESTOR) 20 MG tablet 90 tablet 2 11/10/2023 -- No   Sig - Route: Take 1 tablet by mouth daily. - Oral     ----------------------  Last Office Visit : 9/13/2023  [facility] [place]      Future Office visit:  0  ---------------------      Refill decision: Medication refilled per protocol.    - Pt not seen within past 12 months, no future appointment  - 90 day romy refill given with reminder to schedule visit/labs    -Please order the following labs: Lipid panel    Pass/Fail Protocol Criteria:  Protocol - ANTIHYPERLIPIDEMIC AGENTS  rosuvastatin (CRESTOR)    Protocol Details     LDL on file in the past 12 months:   LDL Cholesterol Calculated   Date Value Ref Range Status   09/13/2023 78 <=100 mg/dL Final   06/30/2021 87 <100 mg/dL Final     Comment:     Desirable:       <100 mg/dl

## 2024-11-24 ENCOUNTER — HEALTH MAINTENANCE LETTER (OUTPATIENT)
Age: 74
End: 2024-11-24